# Patient Record
Sex: FEMALE | Race: WHITE | NOT HISPANIC OR LATINO | Employment: FULL TIME | URBAN - METROPOLITAN AREA
[De-identification: names, ages, dates, MRNs, and addresses within clinical notes are randomized per-mention and may not be internally consistent; named-entity substitution may affect disease eponyms.]

---

## 2017-08-21 ENCOUNTER — TELEPHONE (OUTPATIENT)
Dept: VASCULAR SURGERY | Facility: CLINIC | Age: 52
End: 2017-08-21

## 2017-08-21 NOTE — TELEPHONE ENCOUNTER
----- Message from Citlalli Ruiz sent at 8/21/2017 12:19 PM CDT -----  Contact: patient  Patient returning a missed call. Please advise. Call to pod. Call connected to pod. Warm transferred.  Thanks!

## 2017-08-22 ENCOUNTER — OFFICE VISIT (OUTPATIENT)
Dept: VASCULAR SURGERY | Facility: CLINIC | Age: 52
End: 2017-08-22
Payer: COMMERCIAL

## 2017-08-22 VITALS
HEART RATE: 80 BPM | HEIGHT: 66 IN | WEIGHT: 200.81 LBS | SYSTOLIC BLOOD PRESSURE: 124 MMHG | DIASTOLIC BLOOD PRESSURE: 71 MMHG | BODY MASS INDEX: 32.27 KG/M2

## 2017-08-22 DIAGNOSIS — R06.09 DOE (DYSPNEA ON EXERTION): ICD-10-CM

## 2017-08-22 DIAGNOSIS — I34.0 NON-RHEUMATIC MITRAL REGURGITATION: ICD-10-CM

## 2017-08-22 DIAGNOSIS — I10 ESSENTIAL HYPERTENSION: ICD-10-CM

## 2017-08-22 DIAGNOSIS — D50.0 IRON DEFICIENCY ANEMIA DUE TO CHRONIC BLOOD LOSS: ICD-10-CM

## 2017-08-22 PROCEDURE — 99999 PR PBB SHADOW E&M-EST. PATIENT-LVL III: CPT | Mod: PBBFAC,,, | Performed by: THORACIC SURGERY (CARDIOTHORACIC VASCULAR SURGERY)

## 2017-08-22 PROCEDURE — 99245 OFF/OP CONSLTJ NEW/EST HI 55: CPT | Mod: S$GLB,,, | Performed by: THORACIC SURGERY (CARDIOTHORACIC VASCULAR SURGERY)

## 2017-08-22 RX ORDER — ASPIRIN 81 MG/1
81 TABLET ORAL
Status: ON HOLD | COMMUNITY
End: 2017-09-28 | Stop reason: HOSPADM

## 2017-08-22 RX ORDER — LISINOPRIL 20 MG/1
TABLET ORAL
COMMUNITY
Start: 2017-07-18 | End: 2017-08-22

## 2017-08-22 RX ORDER — FLUOXETINE 10 MG/1
TABLET ORAL
COMMUNITY
Start: 2017-08-13 | End: 2020-02-21

## 2017-08-22 RX ORDER — CARVEDILOL 25 MG/1
TABLET ORAL
Status: ON HOLD | COMMUNITY
Start: 2017-08-11 | End: 2017-09-28 | Stop reason: HOSPADM

## 2017-08-22 RX ORDER — METOPROLOL TARTRATE 25 MG/1
TABLET, FILM COATED ORAL
COMMUNITY
Start: 2017-07-15 | End: 2017-08-22

## 2017-08-22 RX ORDER — ALPRAZOLAM 0.5 MG/1
TABLET ORAL
COMMUNITY
Start: 2017-08-11 | End: 2020-02-27 | Stop reason: SDUPTHER

## 2017-08-22 NOTE — LETTER
August 22, 2017      Steven Morales MD  [address] [facility] [address]           [facility]  [address] [facility] [address]  Phone: [phone]          Patient: Karolina Jacobs   MR Number: [id number]   YOB: 1965   Date of Visit: 8/22/2017       Dear Dr. Steven Morales:    Thank you for referring Karolina Jacobs to me for evaluation. Attached you will find relevant portions of my assessment and plan of care.    If you have questions, please do not hesitate to call me. I look forward to following Karolina Jacobs along with you.    Sincerely,    Herrera Vasquez MD    Enclosure  CC:  No Recipients    If you would like to receive this communication electronically, please contact [email or web address] or [phone] to request more information on [facility] access.    For providers and/or their staff who would like to refer a patient to [facility], please contact us through our one-stop-shop provider referral line, Naseem Guzman, at [phone].    If you feel you have received this communication in error or would no longer like to receive these types of communications, please e-mail [email or web address]

## 2017-08-22 NOTE — PROGRESS NOTES
CLINIC CONSULTATION NOTE    CHIEF COMPLAINT:  Shortness of breath with exertion.    REASON FOR CONSULTATION:  Evaluation of mitral valve disease.    HISTORY OF PRESENT ILLNESS:  The patient is a 52-year-old white female who was   diagnosed in  with mitral valve insufficiency as noted on echocardiogram.    She was told for several years prior to that she had mitral valve prolapse, but   no echocardiography was done.  She has developed significant progressive   exertional dyspnea with fewer and lighter activities.  She has undergone left   and right heart catheterization as well as transesophageal echocardiography.    She has no chest pain.    PAST MEDICAL HISTORY:  Hypertension, mitral valve prolapse, anemia of blood loss   (heavy menstrual period).    PAST SURGICAL HISTORY:  Tubal ligation.    MEDICATIONS:  Include aspirin 81 mg daily, Coreg, fluoxetine, lisinopril,   metoprolol and Xanax p.r.n.    ALLERGIES:  Sulfa.    SOCIAL HISTORY:  Negative for tobacco use, lifelong.    FAMILY HISTORY:  Positive for premature atherosclerotic coronary artery disease   in her father who  at age 45, cancer in maternal grandfather, heart disease   in mother, diabetes in maternal grandfather.    REVIEW OF SYSTEMS:  GENERAL:  The patient has had some slight weight increase over the last two   years.  She has mild-to-moderate fatigue, especially with activities.  HEENT:  Occasional headaches since starting Coreg.  She wears eyeglasses.  NECK:  No complaints.  RESPIRATORY:  Positive shortness of breath with exertion.  CARDIAC:  No angina, orthopnea or PND.  She does have shortness of breath with   exertion and a pinching like left anterior chest pain.  GASTROINTESTINAL:  Positive constipation, especially when taking iron (she   therefore does not take iron).  GENITOURINARY:  No dysuria.  GYNECOLOGIC:  Positive for heavy menstrual bleeding.  Last menstrual period is   now.  MUSCULOSKELETAL:  She has no arthralgias.  NEUROLOGIC:   No lateralizing complaints.    PHYSICAL EXAMINATION:  VITAL SIGNS:  Blood pressure 124/71, heart rate 80, weight 91.1 kg.  GENERAL:  Pleasant, well-nourished, well-developed lady, in no obvious distress.  HEENT:  Normocephalic, atraumatic.  There is good facial symmetry.  NECK:  Trachea is midline.  There is no jugular venous distention.  CHEST:  No chest wall abnormalities.  LUNGS:  Clear bilaterally.  HEART:  Regular rate and rhythm with a II/VI systolic murmur that radiates into   the left axilla.  ABDOMEN:  Soft.  No organomegaly.  EXTREMITIES:  No cyanosis, clubbing or edema.  VASCULAR:  2+ radial and dorsalis pedis pulses.  SKIN:  No rash.  NEUROLOGIC:  Alert and oriented x4 with no focal deficits.    STUDIES:  I reviewed a transthoracic echocardiogram personally revealing   moderate mitral insufficiency.  I have reviewed left heart catheterization from   08/16/2017 revealing normal coronary arteries.  Left and right heart   catheterization results include aortic pressure 113/80, LVEDP 19, left   ventricular ejection fraction 50-55% with 2+ to 3+ mitral insufficiency, RA 5,   RV 20/6, PA 20/10, wedge 13 with a large V-wave up to 19.  Cardiac output 5.7   liters per minute (2.8 cardiac index).  Transesophageal echocardiogram reveals   by report severe mitral insufficiency through what appears to be prolapse of the   P2-P3 segments of the posterior leaflet.  Left atrium is 4 cm.  Left   ventricular ejection fraction 61.6%.    IMPRESSION:  1.  Moderate-to-severe mitral valve insufficiency related to prolapse of the   posterior leaflet.  2.  Exertional dyspnea secondary to above.  3.  Anemia (hemoglobin of 10), secondary to chronic blood loss.  4.  Essential hypertension.    RECOMMENDATIONS:  Before I recommend surgery for the patient's mitral valve, I   would like to review the transesophageal echocardiogram personally with Dr. Morales.  Images for this are not available for my review currently.  If it   is  simply the central leaflet that is prolapsed posteriorly, then there is a   possibility of mitral valve repair, but if both P2 and P3 are prolapsing, she   would require replacement, which at her age, I would recommend a mechanical   valve.  The difficulty with this is that it would require warfarin, which would   likely increase problems with her menstrual bleeding and chronic anemia.  After   discussion with Dr. Morales, I will have the patient return for further   discussion.      DIONICIO/LUZ MARIA  dd: 08/22/2017 14:21:13 (CDT)  td: 08/22/2017 16:41:17 (CDT)  Doc ID   #[id number]  Job ID #[id number]    CC: Steven Morales M.D.

## 2017-08-28 ENCOUNTER — TELEPHONE (OUTPATIENT)
Dept: VASCULAR SURGERY | Facility: CLINIC | Age: 52
End: 2017-08-28

## 2017-08-28 NOTE — TELEPHONE ENCOUNTER
----- Message from Ama Fisher sent at 8/28/2017 10:38 AM CDT -----  Contact: patient  Calling concerning a office appointment and surgery date. Please call patient ASAP today @ [phone]. Thanks, lewis

## 2017-08-29 ENCOUNTER — TELEPHONE (OUTPATIENT)
Dept: VASCULAR SURGERY | Facility: CLINIC | Age: 52
End: 2017-08-29

## 2017-08-29 NOTE — TELEPHONE ENCOUNTER
Discussed BARBRA findings with patient (after my review with Dr Morales).  MR likely from combined P2 and P3 prolapse. This would be much more difficult to repair, and it would more likely require mitral replacement in my hands.    Regardless of this, I believe that she should have her heavy menstrual bleeding corrected before mitral surgery with potential need for post op coumadin.    Will refer to Dr Russell for his opinion on chances of repair of the valve.

## 2017-09-05 ENCOUNTER — OFFICE VISIT (OUTPATIENT)
Dept: CARDIOTHORACIC SURGERY | Facility: CLINIC | Age: 52
End: 2017-09-05
Payer: COMMERCIAL

## 2017-09-05 VITALS
WEIGHT: 200.81 LBS | OXYGEN SATURATION: 100 % | BODY MASS INDEX: 32.27 KG/M2 | HEART RATE: 72 BPM | TEMPERATURE: 98 F | SYSTOLIC BLOOD PRESSURE: 120 MMHG | HEIGHT: 66 IN | DIASTOLIC BLOOD PRESSURE: 58 MMHG

## 2017-09-05 DIAGNOSIS — I34.0 NON-RHEUMATIC MITRAL REGURGITATION: Primary | ICD-10-CM

## 2017-09-05 PROCEDURE — 99999 PR PBB SHADOW E&M-EST. PATIENT-LVL III: CPT | Mod: PBBFAC,,, | Performed by: THORACIC SURGERY (CARDIOTHORACIC VASCULAR SURGERY)

## 2017-09-05 PROCEDURE — 99214 OFFICE O/P EST MOD 30 MIN: CPT | Mod: S$GLB,,, | Performed by: THORACIC SURGERY (CARDIOTHORACIC VASCULAR SURGERY)

## 2017-09-05 PROCEDURE — 3008F BODY MASS INDEX DOCD: CPT | Mod: S$GLB,,, | Performed by: THORACIC SURGERY (CARDIOTHORACIC VASCULAR SURGERY)

## 2017-09-05 NOTE — PROGRESS NOTES
Subjective:      Patient ID: Karolina Jacobs is a 52 y.o. female.    Chief Complaint: Consult    HPI:  Karolina Jacobs is a 52 y.o. female who present with complaints of dyspnea on exertion. PMHx of mitral valve insufficiency in 2013, anemia, HTN. She was told for several years prior to that she had mitral valve prolapse, but   no echocardiography was done.  She has developed significant progressive exertional dyspnea with fewer and lighter activities. She has undergone left and right heart catheterization as well as transesophageal echocardiography.      Review of patient's allergies indicates:   Allergen Reactions    Sulfa (sulfonamide antibiotics) Other (See Comments)     Unknown, pt was a child when she had reaction     Past Medical History:   Diagnosis Date    MAGDALENO (dyspnea on exertion) 8/22/2017    Essential hypertension 8/22/2017    Iron deficiency anemia due to chronic blood loss 8/22/2017    Non-rheumatic mitral regurgitation 8/22/2017     No past surgical history on file.  Family History     None        Social History     Social History    Marital status:      Spouse name: N/A    Number of children: N/A    Years of education: N/A     Occupational History    Not on file.     Social History Main Topics    Smoking status: Never Smoker    Smokeless tobacco: Never Used    Alcohol use No    Drug use: Unknown    Sexual activity: Not on file     Other Topics Concern    Not on file     Social History Narrative    No narrative on file       Current medications Reviewed    Review of Systems   Constitutional: Positive for activity change and fatigue. Negative for appetite change and fever.   HENT: Negative for congestion, dental problem, sneezing and sore throat.    Eyes: Negative for pain, discharge and visual disturbance.   Respiratory: Positive for shortness of breath. Negative for cough and wheezing.    Cardiovascular: Positive for palpitations. Negative for chest pain and leg swelling.    Gastrointestinal: Negative for abdominal distention, abdominal pain, constipation, diarrhea, nausea and vomiting.   Endocrine: Negative for polydipsia, polyphagia and polyuria.   Genitourinary: Negative for dysuria, frequency and urgency.   Musculoskeletal: Negative for back pain and gait problem.   Skin: Negative for rash and wound.   Neurological: Negative for dizziness, seizures, syncope and headaches.   Hematological: Does not bruise/bleed easily.   Psychiatric/Behavioral: Negative for agitation and confusion. The patient is not nervous/anxious.      Objective:   Physical Exam   Constitutional: She is oriented to person, place, and time. She appears well-developed and well-nourished.   HENT:   Head: Normocephalic and atraumatic.   Eyes: Pupils are equal, round, and reactive to light.   Neck: Normal range of motion. Neck supple.   Cardiovascular: Normal rate and regular rhythm.    Murmur heard.  Pulmonary/Chest: Effort normal and breath sounds normal.   Abdominal: Soft. Bowel sounds are normal.   Musculoskeletal: Normal range of motion.   Neurological: She is alert and oriented to person, place, and time.   Skin: Skin is warm and dry.   Psychiatric: She has a normal mood and affect. Her behavior is normal.     Assessment:   1. Mitral Valve prolapse   2. Mitral valve regurg   3. Iron deficiency anemia  4. Heavy menstrual bleeding   5. HTN  Plan:       CTS Attending Note:    I have personally taken the history and examined this patient and agree with the NP's note as stated above. 53 yo F with severe MR and lifestyle limiting MAGDALENO. NL cors on C. I recommended MVr.  We discussed the risks and benefits of the proposed procedure, including but not limited to death, stroke, respiratory complications, renal complications, arrythmia, need for permanent pacemaker, and infection. Her questions have been answered, and she wishes to proceed. The patient is considering valve choice after a discussion of the advantages and  disadvantages of mechanical and tissue prostheses should repair not be feasible.

## 2017-09-05 NOTE — LETTER
[facility]  [address]  [place] [address]  Phone: [phone] September 7, 2017        Steven Morales MD  [address] [address]    Patient: Karolina Jacobs   MR Number: [id number]   YOB: 1965   Date of Visit: 9/5/2017     Dear Dr. Morales:    I had the pleasure of seeing your patient Mrs. Karolina Jacobs in clinic today.  As you know, she is a pleasant 52-year-old woman that has been followed for some time for mitral prolapse.  She recently developed lifestyle limiting dyspnea on exertion, and after a thoughtful and thorough evaluation her mitral prolapse was found to have progressed to severe mitral regurgitation.  The mechanism of her mitral regurgitation seemed to involve multiple portions of the posterior leaflet, and as a result Dr. Murry asked me to see her for evaluation for mitral repair.    I saw Mrs. Jacobs, and discussed her situation with her and her daughter in detail.  I recommended mitral valve repair, and she agreed with this.  We will plan to get this done for her sometime in the near future.  When she does come to surgery, I will certainly keep you appraised of her progress.     Thank you for referring Karolina Jacobs to me for evaluation. If you have any questions, please do not hesitate to call me.     Sincerely,        Philip Russell MD   Chief, Division of Thoracic & Cardiovascular Surgery  [facility]    PEP/ecs      CC  MD Herrera Lopez MD

## 2017-09-21 ENCOUNTER — OFFICE VISIT (OUTPATIENT)
Dept: CARDIOTHORACIC SURGERY | Facility: CLINIC | Age: 52
End: 2017-09-21
Payer: COMMERCIAL

## 2017-09-21 ENCOUNTER — HOSPITAL ENCOUNTER (OUTPATIENT)
Dept: VASCULAR SURGERY | Facility: CLINIC | Age: 52
Discharge: HOME | End: 2017-09-21
Attending: THORACIC SURGERY (CARDIOTHORACIC VASCULAR SURGERY)
Payer: COMMERCIAL

## 2017-09-21 ENCOUNTER — HOSPITAL ENCOUNTER (OUTPATIENT)
Dept: PULMONOLOGY | Facility: CLINIC | Age: 52
Discharge: HOME | End: 2017-09-21
Payer: COMMERCIAL

## 2017-09-21 ENCOUNTER — HOSPITAL ENCOUNTER (OUTPATIENT)
Dept: CARDIOLOGY | Facility: CLINIC | Age: 52
Discharge: HOME | End: 2017-09-21
Payer: COMMERCIAL

## 2017-09-21 ENCOUNTER — HOSPITAL ENCOUNTER (OUTPATIENT)
Dept: PREADMISSION TESTING | Facility: HOSPITAL | Age: 52
Discharge: HOME | End: 2017-09-21
Attending: ANESTHESIOLOGY
Payer: COMMERCIAL

## 2017-09-21 ENCOUNTER — ANESTHESIA EVENT (OUTPATIENT)
Dept: SURGERY | Facility: HOSPITAL | Age: 52
DRG: 221 | End: 2017-09-21
Payer: COMMERCIAL

## 2017-09-21 ENCOUNTER — HOSPITAL ENCOUNTER (OUTPATIENT)
Dept: RADIOLOGY | Facility: HOSPITAL | Age: 52
Discharge: HOME | End: 2017-09-21
Attending: THORACIC SURGERY (CARDIOTHORACIC VASCULAR SURGERY)
Payer: COMMERCIAL

## 2017-09-21 VITALS
RESPIRATION RATE: 18 BRPM | OXYGEN SATURATION: 99 % | BODY MASS INDEX: 32.51 KG/M2 | HEIGHT: 66 IN | WEIGHT: 202.31 LBS | SYSTOLIC BLOOD PRESSURE: 114 MMHG | HEART RATE: 72 BPM | TEMPERATURE: 99 F | DIASTOLIC BLOOD PRESSURE: 55 MMHG

## 2017-09-21 VITALS
DIASTOLIC BLOOD PRESSURE: 63 MMHG | BODY MASS INDEX: 32.62 KG/M2 | WEIGHT: 203 LBS | HEIGHT: 66 IN | HEART RATE: 75 BPM | SYSTOLIC BLOOD PRESSURE: 126 MMHG | TEMPERATURE: 99 F | OXYGEN SATURATION: 100 %

## 2017-09-21 DIAGNOSIS — I34.0 NON-RHEUMATIC MITRAL REGURGITATION: ICD-10-CM

## 2017-09-21 DIAGNOSIS — I34.0 NON-RHEUMATIC MITRAL REGURGITATION: Primary | ICD-10-CM

## 2017-09-21 DIAGNOSIS — I65.23 CAROTID STENOSIS, BILATERAL: ICD-10-CM

## 2017-09-21 DIAGNOSIS — Z01.810 PREOP CARDIOVASCULAR EXAM: ICD-10-CM

## 2017-09-21 DIAGNOSIS — I34.0 MITRAL REGURGITATION: ICD-10-CM

## 2017-09-21 LAB
PRE FEV1 FVC: 76
PRE FEV1: 2.46
PRE FVC: 3.25
PREDICTED FEV1 FVC: 80
PREDICTED FEV1: 2.85
PREDICTED FVC: 3.52

## 2017-09-21 PROCEDURE — 71020 XR CHEST PA AND LATERAL: CPT | Mod: TC

## 2017-09-21 PROCEDURE — 93000 ELECTROCARDIOGRAM COMPLETE: CPT | Mod: S$GLB,,, | Performed by: INTERNAL MEDICINE

## 2017-09-21 PROCEDURE — 36600 WITHDRAWAL OF ARTERIAL BLOOD: CPT | Mod: 51,S$GLB,, | Performed by: INTERNAL MEDICINE

## 2017-09-21 PROCEDURE — 93880 EXTRACRANIAL BILAT STUDY: CPT | Mod: S$GLB,,, | Performed by: SURGERY

## 2017-09-21 PROCEDURE — 71020 XR CHEST PA AND LATERAL: CPT | Mod: 26,,, | Performed by: RADIOLOGY

## 2017-09-21 PROCEDURE — 99999 PR PBB SHADOW E&M-EST. PATIENT-LVL IV: CPT | Mod: PBBFAC,,, | Performed by: THORACIC SURGERY (CARDIOTHORACIC VASCULAR SURGERY)

## 2017-09-21 PROCEDURE — 94010 BREATHING CAPACITY TEST: CPT | Mod: S$GLB,,, | Performed by: INTERNAL MEDICINE

## 2017-09-21 PROCEDURE — 99499 UNLISTED E&M SERVICE: CPT | Mod: S$GLB,,, | Performed by: THORACIC SURGERY (CARDIOTHORACIC VASCULAR SURGERY)

## 2017-09-21 PROCEDURE — 82803 BLOOD GASES ANY COMBINATION: CPT | Mod: S$GLB,,, | Performed by: INTERNAL MEDICINE

## 2017-09-21 RX ORDER — POTASSIUM CHLORIDE 14.9 MG/ML
60 INJECTION INTRAVENOUS
Status: CANCELLED | OUTPATIENT
Start: 2017-09-21

## 2017-09-21 RX ORDER — MUPIROCIN 20 MG/G
1 OINTMENT TOPICAL
Status: CANCELLED | OUTPATIENT
Start: 2017-09-21

## 2017-09-21 RX ORDER — MUPIROCIN 20 MG/G
1 OINTMENT TOPICAL 2 TIMES DAILY
Status: CANCELLED | OUTPATIENT
Start: 2017-09-21 | End: 2017-09-26

## 2017-09-21 RX ORDER — ASPIRIN 325 MG
325 TABLET, DELAYED RELEASE (ENTERIC COATED) ORAL DAILY
Status: CANCELLED | OUTPATIENT
Start: 2017-09-22

## 2017-09-21 RX ORDER — SODIUM CHLORIDE 0.9 % (FLUSH) 0.9 %
3 SYRINGE (ML) INJECTION EVERY 8 HOURS
Status: CANCELLED | OUTPATIENT
Start: 2017-09-21

## 2017-09-21 RX ORDER — POTASSIUM CHLORIDE 14.9 MG/ML
20 INJECTION INTRAVENOUS
Status: CANCELLED | OUTPATIENT
Start: 2017-09-21

## 2017-09-21 RX ORDER — FENTANYL CITRATE 50 UG/ML
25 INJECTION, SOLUTION INTRAMUSCULAR; INTRAVENOUS
Status: CANCELLED | OUTPATIENT
Start: 2017-09-21

## 2017-09-21 RX ORDER — PROPOFOL 10 MG/ML
5 INJECTION, EMULSION INTRAVENOUS CONTINUOUS
Status: CANCELLED | OUTPATIENT
Start: 2017-09-21

## 2017-09-21 RX ORDER — ATORVASTATIN CALCIUM 10 MG/1
40 TABLET, FILM COATED ORAL NIGHTLY
Status: CANCELLED | OUTPATIENT
Start: 2017-09-21

## 2017-09-21 RX ORDER — FENTANYL CITRATE 50 UG/ML
25 INJECTION, SOLUTION INTRAMUSCULAR; INTRAVENOUS
Status: CANCELLED | OUTPATIENT
Start: 2017-09-21 | End: 2017-09-21

## 2017-09-21 RX ORDER — POTASSIUM CHLORIDE 29.8 MG/ML
40 INJECTION INTRAVENOUS
Status: CANCELLED | OUTPATIENT
Start: 2017-09-21

## 2017-09-21 RX ORDER — METOPROLOL TARTRATE 25 MG/1
12.5 TABLET ORAL EVERY 12 HOURS
Status: CANCELLED | OUTPATIENT
Start: 2017-09-21

## 2017-09-21 RX ORDER — ASPIRIN 325 MG
325 TABLET ORAL DAILY
Status: CANCELLED | OUTPATIENT
Start: 2017-09-21

## 2017-09-21 RX ORDER — POTASSIUM CHLORIDE 750 MG/1
20 TABLET, EXTENDED RELEASE ORAL EVERY 12 HOURS
Status: CANCELLED | OUTPATIENT
Start: 2017-09-21

## 2017-09-21 RX ORDER — ACETAMINOPHEN 325 MG/1
650 TABLET ORAL EVERY 4 HOURS PRN
Status: CANCELLED | OUTPATIENT
Start: 2017-09-21

## 2017-09-21 RX ORDER — IPRATROPIUM BROMIDE AND ALBUTEROL SULFATE 2.5; .5 MG/3ML; MG/3ML
3 SOLUTION RESPIRATORY (INHALATION) EVERY 4 HOURS PRN
Status: CANCELLED | OUTPATIENT
Start: 2017-09-21

## 2017-09-21 RX ORDER — METOCLOPRAMIDE HYDROCHLORIDE 5 MG/ML
5 INJECTION INTRAMUSCULAR; INTRAVENOUS EVERY 6 HOURS PRN
Status: CANCELLED | OUTPATIENT
Start: 2017-09-21

## 2017-09-21 RX ORDER — PANTOPRAZOLE SODIUM 40 MG/1
40 TABLET, DELAYED RELEASE ORAL
Status: CANCELLED | OUTPATIENT
Start: 2017-09-22

## 2017-09-21 RX ORDER — IPRATROPIUM BROMIDE AND ALBUTEROL SULFATE 2.5; .5 MG/3ML; MG/3ML
3 SOLUTION RESPIRATORY (INHALATION) EVERY 4 HOURS
Status: CANCELLED | OUTPATIENT
Start: 2017-09-21 | End: 2017-09-22

## 2017-09-21 RX ORDER — OXYCODONE HYDROCHLORIDE 5 MG/1
5 TABLET ORAL EVERY 4 HOURS PRN
Status: CANCELLED | OUTPATIENT
Start: 2017-09-21

## 2017-09-21 RX ORDER — LIDOCAINE HYDROCHLORIDE 10 MG/ML
1 INJECTION, SOLUTION EPIDURAL; INFILTRATION; INTRACAUDAL; PERINEURAL
Status: CANCELLED | OUTPATIENT
Start: 2017-09-21

## 2017-09-21 RX ORDER — ASPIRIN 300 MG/1
300 SUPPOSITORY RECTAL ONCE AS NEEDED
Status: CANCELLED | OUTPATIENT
Start: 2017-09-21 | End: 2017-09-21

## 2017-09-21 RX ORDER — SODIUM CHLORIDE 9 MG/ML
INJECTION, SOLUTION INTRAVENOUS CONTINUOUS
Status: CANCELLED | OUTPATIENT
Start: 2017-09-21

## 2017-09-21 RX ORDER — FUROSEMIDE 10 MG/ML
20 INJECTION INTRAMUSCULAR; INTRAVENOUS 2 TIMES DAILY
Status: CANCELLED | OUTPATIENT
Start: 2017-09-21

## 2017-09-21 RX ORDER — ONDANSETRON 2 MG/ML
4 INJECTION INTRAMUSCULAR; INTRAVENOUS EVERY 12 HOURS PRN
Status: CANCELLED | OUTPATIENT
Start: 2017-09-21

## 2017-09-21 RX ORDER — ASPIRIN 325 MG
325 TABLET ORAL ONCE
Status: CANCELLED | OUTPATIENT
Start: 2017-09-21 | End: 2017-09-21

## 2017-09-21 RX ORDER — POLYETHYLENE GLYCOL 3350 17 G/17G
17 POWDER, FOR SOLUTION ORAL DAILY
Status: CANCELLED | OUTPATIENT
Start: 2017-09-22

## 2017-09-21 RX ORDER — METOPROLOL TARTRATE 25 MG/1
25 TABLET ORAL
Status: CANCELLED | OUTPATIENT
Start: 2017-09-21

## 2017-09-21 RX ORDER — DEXTROSE MONOHYDRATE, SODIUM CHLORIDE, AND POTASSIUM CHLORIDE 50; 1.49; 4.5 G/1000ML; G/1000ML; G/1000ML
INJECTION, SOLUTION INTRAVENOUS CONTINUOUS
Status: CANCELLED | OUTPATIENT
Start: 2017-09-21

## 2017-09-21 RX ORDER — ALBUMIN HUMAN 50 G/1000ML
500 SOLUTION INTRAVENOUS ONCE AS NEEDED
Status: CANCELLED | OUTPATIENT
Start: 2017-09-21 | End: 2017-09-21

## 2017-09-21 RX ORDER — DOCUSATE SODIUM 100 MG/1
100 CAPSULE, LIQUID FILLED ORAL 2 TIMES DAILY
Status: CANCELLED | OUTPATIENT
Start: 2017-09-21

## 2017-09-21 RX ORDER — BISACODYL 10 MG
10 SUPPOSITORY, RECTAL RECTAL EVERY 12 HOURS PRN
Status: CANCELLED | OUTPATIENT
Start: 2017-09-21

## 2017-09-21 RX ORDER — FENTANYL CITRATE 50 UG/ML
50 INJECTION, SOLUTION INTRAMUSCULAR; INTRAVENOUS
Status: CANCELLED | OUTPATIENT
Start: 2017-09-21

## 2017-09-21 RX ORDER — OXYCODONE HYDROCHLORIDE 5 MG/1
10 TABLET ORAL EVERY 4 HOURS PRN
Status: CANCELLED | OUTPATIENT
Start: 2017-09-21

## 2017-09-21 RX ORDER — PANTOPRAZOLE SODIUM 40 MG/10ML
40 INJECTION, POWDER, LYOPHILIZED, FOR SOLUTION INTRAVENOUS DAILY
Status: CANCELLED | OUTPATIENT
Start: 2017-09-22

## 2017-09-21 NOTE — DISCHARGE INSTRUCTIONS
Your surgery has been scheduled for:__________________________________________    You should report to:  ____[facility], located on the [place] side of the first floor of the           [facility] ([phone])  ____[facility], located on the [facility] side of the            Second floor of the [facility] ([phone])  ____3rd Floor [facility] located on the [facility] side of the [facility] [phone]  Please Note   - Tell your doctor if you take Aspirin, products containing Aspirin, herbal medications  or blood thinners, such as Coumadin, Ticlid, or Plavix.  (Consult your provider regarding holding or stopping before surgery).  - Arrange for someone to drive you home following surgery.  You will not be allowed to leave the surgical facility alone or drive yourself home following sedation and anesthesia.  Before Surgery  - Stop taking all herbal medications 14days prior to surgery  - No Motrin/Advil (Ibuprofen) 7 days before surgery  - No Aleve (Naproxen) 7 days before surgery  - Stop Taking Asprin, products containing Asprin _____days before surgery  - Stop taking blood thinners_______days before surgery  - Refrain from drinking alcoholic beverages for 24hours before and after surgery  - Stop or limit smoking _________days before surgery  Night before Surgery  - DO NOT EAT OR DRINK ANYTHING AFTER MIDNIGHT, INCLUDING GUM, HARD CANDY, MINTS, OR CHEWING TOBACCO.  - Take a shower or bath (shower is recommended).  Bathe with Hibiclens soap or an antibacterial soap from the neck down.  If not supplied by your surgeon, hibiclens soap will need to be purchased over the counter in pharmacy.  Rinse soap off thoroughly.  - Shampoo your hair with your regular shampoo  The Day of Surgery  - Take another bath or shower with hibiclens or any antibacterial soap, to reduce the chance of infection.  - Take heart and blood  pressure medications with a small sip of water, as advised by the perioperative team.  - Do not take fluid pills  - You may brush your teeth and rinse your mouth, but do not swall any additional water.   - Do not apply perfumes, powder, body lotions or deodorant on the day of surgery.  - Nail polish should be removed.  - Do not wear makeup or moisturizer  - Wear comfortable clothes, such as a button front shirt and loose fitting pants.  - Leave all jewelry, including body piercings, and valuables at home.    - Bring any devices you will neeed after surgery such as crutches or canes.  - If you have sleep apnea, please bring your CPAP machine  In the event that your physical condition changes including the onset of a cold or respiratory illness, or if you have to delay or cancel your surgery, please notify your surgeon.  Anesthesia: General Anesthesia  Youre due to have surgery. During surgery, youll be given medication called anesthesia. (It is also called anesthetic.) This will keep you comfortable and pain-free. Your anesthesia provider will use general anesthesia. This sheet tells you more about it.  What is general anesthesia?     You are watched continuously during your procedure by the anesthesia provider   General anesthesia puts you into a state like deep sleep. It goes into the bloodstream (IV anesthetics), into the lungs (gas anesthetics), or both. You feel nothing during the procedure. You will not remember it. During the procedure, the anesthesia provider monitors you continuously. He or she checks your heart rate and rhythm, blood pressure, breathing, and blood oxygen.  · IV Anesthetics. IV anesthetics are given through an IV line in your arm. Theyre often given first. This is so you are asleep before a gas anesthetic is started. Some kinds of IV anesthetics relieve pain. Others relax you. Your doctor will decide which kind is best in your case.  · Gas Anesthetics. Gas anesthetics are breathed into  the lungs. They are often used to keep you asleep. They can be given through a facemask or a tube placed in your larynx or trachea (breathing tube).  ? If you have a facemask, your anesthesia provider will most likely place it over your nose and mouth while youre still awake. Youll breathe oxygen through the mask as your IV anesthetic is started. Gas anesthetic may be added through the mask.  ? If you have a tube in the larynx or trachea, it will be inserted into your throat after youre asleep.  Anesthesia tools and medications  You will likely have:  · IV anesthetics. These are put into an IV line into your bloodstream.  · Gas anesthetics. You breathe these anesthetics into your lungs, where they pass into your bloodstream.  · Pulse oximeter. This is a small clip that is attached to the end of your finger. This measures your blood oxygen level.  · Electrocardiography leads (electrodes). These are small sticky pads that are placed on your chest. They record your heart rate and rhythm.  · Blood pressure cuff. This reads your blood pressure.  Risks and possible complications  General anesthesia has some risks. These include:  · Breathing problems  · Nausea and vomiting  · Sore throat or hoarseness (usually temporary)  · Allergic reaction to the anesthetic  · Irregular heartbeat (rare)  · Cardiac arrest (rare)   Anesthesia safety  · Follow all instructions you are given for how long not to eat or drink before your procedure.  · Be sure your doctor knows what medications and drugs you take. This includes over-the-counter medications, herbs, supplements, alcohol or other drugs. You will be asked when those were last taken.  · Have an adult family member or friend drive you home after the procedure.  · For the first 24 hours after your surgery:  ? Do not drive or use heavy equipment.  ? Have a trusted family member or spouse make important decisions or sign documents.  ? Avoid alcohol.  ? Have a responsible adult stay  with you. He or she can watch for problems and help keep you safe.  Date Last Reviewed: 10/16/2014  © 8345-6266 [employer]. [address] [place] [address], [place] [address]. All rights reserved. This information is not intended as a substitute for professional medical care. Always follow your healthcare professional's instructions.

## 2017-09-21 NOTE — PROGRESS NOTES
Subjective:      Patient ID: Karolina Jacobs is a 52 y.o. female.    Chief Complaint: Pre-op Exam    HPI:  Karolina Jacobs is a 52 y.o. female who present with complaints of dyspnea on exertion. PMHx of mitral valve insufficiency in 2013, anemia, HTN. She was told for several years prior to that she had mitral valve prolapse, but   no echocardiography was done.  She has developed significant progressive exertional dyspnea with fewer and lighter activities. She has undergone left and right heart catheterization as well as transesophageal echocardiography.      Review of patient's allergies indicates:   Allergen Reactions    Sulfa (sulfonamide antibiotics) Other (See Comments)     Unknown, pt was a child when she had reaction     Past Medical History:   Diagnosis Date    MAGDALENO (dyspnea on exertion) 8/22/2017    Essential hypertension 8/22/2017    Iron deficiency anemia due to chronic blood loss 8/22/2017    Non-rheumatic mitral regurgitation 8/22/2017     No past surgical history on file.  Family History     None        Social History     Social History    Marital status:      Spouse name: N/A    Number of children: N/A    Years of education: N/A     Occupational History    Not on file.     Social History Main Topics    Smoking status: Never Smoker    Smokeless tobacco: Never Used    Alcohol use No    Drug use: Unknown    Sexual activity: Not on file     Other Topics Concern    Not on file     Social History Narrative    No narrative on file       Current medications Reviewed    Review of Systems   Constitutional: Positive for activity change and fatigue. Negative for appetite change and fever.   HENT: Negative for congestion, dental problem, sneezing and sore throat.    Eyes: Negative for pain, discharge and visual disturbance.   Respiratory: Positive for shortness of breath. Negative for cough and wheezing.    Cardiovascular: Positive for palpitations. Negative for chest pain and leg swelling.    Gastrointestinal: Negative for abdominal distention, abdominal pain, constipation, diarrhea, nausea and vomiting.   Endocrine: Negative for polydipsia, polyphagia and polyuria.   Genitourinary: Negative for dysuria, frequency and urgency.   Musculoskeletal: Negative for back pain and gait problem.   Skin: Negative for rash and wound.   Neurological: Negative for dizziness, seizures, syncope and headaches.   Hematological: Does not bruise/bleed easily.   Psychiatric/Behavioral: Negative for agitation and confusion. The patient is not nervous/anxious.      Objective:   Physical Exam   Constitutional: She is oriented to person, place, and time. She appears well-developed and well-nourished.   HENT:   Head: Normocephalic and atraumatic.   Eyes: Pupils are equal, round, and reactive to light.   Neck: Normal range of motion. Neck supple.   Cardiovascular: Normal rate and regular rhythm.    Murmur heard.  Pulmonary/Chest: Effort normal and breath sounds normal.   Abdominal: Soft. Bowel sounds are normal.   Musculoskeletal: Normal range of motion.   Neurological: She is alert and oriented to person, place, and time.   Skin: Skin is warm and dry.   Psychiatric: She has a normal mood and affect. Her behavior is normal.     Assessment:   1. Mitral Valve prolapse   2. Mitral valve regurg   3. Iron deficiency anemia  4. Heavy menstrual bleeding   5. HTN  Plan:       - To OR for MVR on 9/25/17  - Consents signed and in chart    Rosales Ruiz M.D.  General Surgery PGY2  [phone]        CTS Attending Note:    I have personally taken the history and examined this patient and agree with the resident's note as stated above. Questions answered. She desires a mechanical valve if repair is not feasible.

## 2017-09-21 NOTE — ANESTHESIA PREPROCEDURE EVALUATION
09/21/2017    Pre-operative evaluation for REPAIR VALVE-MITRAL (N/A)    Karolina Jacobs is a 52 y.o. female with a pmhx of HTN, MVP, anemia and MR who was recently evaluated by CTS for MAGDALENO. She is now presenting for procedure noted above.     Past Medical History:   Diagnosis Date    MAGDALENO (dyspnea on exertion) 8/22/2017    Essential hypertension 8/22/2017    Iron deficiency anemia due to chronic blood loss 8/22/2017    Non-rheumatic mitral regurgitation 8/22/2017     No past surgical history on file.      Vital Signs Range (Last 24H):         CBC:     Recent Labs  Lab 09/21/17  1217   WBC 7.07   RBC 4.64   HGB 9.5*   HCT 30.9*      MCV 67*   MCH 20.5*   MCHC 30.7*       CMP:   Recent Labs  Lab 09/21/17  1217      K 4.1      CO2 25   BUN 11   CREATININE 0.9   GLU 73   CALCIUM 9.3   ALBUMIN 3.6   PROT 7.4   ALKPHOS 87   ALT 12   AST 17   BILITOT 0.4     INR:    Recent Labs  Lab 09/21/17  1217   INR 1.0   APTT 22.2     Diagnostic Studies:      EKG:    BARBRA(8/11/17):  Normal LVEF, MVP with small PFO, moderate MR    Anesthesia Evaluation    I have reviewed the Patient Summary Reports.    I have reviewed the Nursing Notes.   I have reviewed the Medications.     Review of Systems  Anesthesia Hx:  No problems with previous Anesthesia Hx tubal ligation   Social:  Non-Smoker, No Alcohol Use    Hematology/Oncology:  Hematology Normal   Oncology Normal     EENT/Dental:  EENT/Dental Normal  Jaw Problems: Clinches teeth and patient reports her mouth hurts if she tries to leave her mouth open wide  Cardiovascular:   Hypertension Valvular problems/Murmurs (dx at 24 years of age), MVP, MR MAGDALENO ECG has been reviewed. Self care, walks, can climb a flight of stairs.  Has to stop to catch her breath.  Denies chest pain   Pulmonary:  Pulmonary Normal Shortness of breath: at rest and exertion.     Renal/:  Renal/ Normal     Hepatic/GI:  Hepatic/GI Normal    Musculoskeletal:  Musculoskeletal Normal Occasional neck and back pain. Denies Musculoskeletal General/Symptoms    Neurological:  Neurology Normal  Denies CVA. Headaches (occipatal migranes) Denies Seizures.  Pain , onset is chronic , location of neck and back , quality of aching/dull , precipitating factors are activity    Endocrine:  Endocrine Normal    Dermatological:  Skin Normal    Psych:  Psychiatric Normal           Physical Exam  General:  Well nourished    Airway/Jaw/Neck:  Airway Findings: Mouth Opening: Normal Tongue: Normal  General Airway Assessment: Adult  Jaw/Neck Findings: (patient reports holding her head to one side for a long perior of times causes migranes)  Neck ROM: Normal ROM      Dental:  Dental Findings: (one of the front teeth was chipped and repaired) molar caps   Chest/Lungs:  Chest/Lungs Findings: Clear to auscultation, Normal Respiratory Rate     Heart/Vascular:  Heart Findings: Rate: Normal  Rhythm: Regular Rhythm  Sounds: Normal  Heart Murmur        Mental Status:  Mental Status Findings:  Cooperative, Alert and Oriented         Labs and ekg reviewed    Nga Watkins RN 09/21/2017        Anesthesia Plan  Type of Anesthesia, risks & benefits discussed:  Anesthesia Type:  general  Patient's Preference: General  Intra-op Monitoring Plan: standard ASA monitors  Intra-op Monitoring Plan Comments:   Post Op Pain Control Plan:   Post Op Pain Control Plan Comments: IV pain meds per primary service with On que pain pump  Induction:   IV  Beta Blocker:  Patient is on a Beta-Blocker and has received one dose within the past 24 hours (No further documentation required).       Informed Consent: Patient understands risks and agrees with Anesthesia plan.  Questions answered. Anesthesia consent signed with patient.  ASA Score: 4     Day of Surgery Review of History & Physical:    H&P update referred to the surgeon.     Anesthesia  Plan Notes: Discussed plan for awake arterial line, central line, intraoperative BARBRA, and post operative ICU Care        Ready For Surgery From Anesthesia Perspective.

## 2017-09-25 ENCOUNTER — HOSPITAL ENCOUNTER (INPATIENT)
Facility: HOSPITAL | Age: 52
LOS: 3 days | Discharge: HOME | DRG: 221 | End: 2017-09-28
Attending: THORACIC SURGERY (CARDIOTHORACIC VASCULAR SURGERY) | Admitting: THORACIC SURGERY (CARDIOTHORACIC VASCULAR SURGERY)
Payer: COMMERCIAL

## 2017-09-25 ENCOUNTER — ANESTHESIA (OUTPATIENT)
Dept: SURGERY | Facility: HOSPITAL | Age: 52
DRG: 221 | End: 2017-09-25
Payer: COMMERCIAL

## 2017-09-25 ENCOUNTER — SURGERY (OUTPATIENT)
Age: 52
End: 2017-09-25

## 2017-09-25 DIAGNOSIS — I49.9 ARRHYTHMIA: ICD-10-CM

## 2017-09-25 DIAGNOSIS — Z98.890 HISTORY OF HEART SURGERY: ICD-10-CM

## 2017-09-25 DIAGNOSIS — R73.9 HYPERGLYCEMIA: ICD-10-CM

## 2017-09-25 DIAGNOSIS — I34.0 MITRAL REGURGITATION: ICD-10-CM

## 2017-09-25 DIAGNOSIS — Z95.2 HEART VALVE REPLACED: ICD-10-CM

## 2017-09-25 DIAGNOSIS — Z98.890 S/P MITRAL VALVE REPAIR: ICD-10-CM

## 2017-09-25 DIAGNOSIS — Z99.11 ENCOUNTER FOR WEANING FROM VENTILATOR: ICD-10-CM

## 2017-09-25 DIAGNOSIS — I34.0 NON-RHEUMATIC MITRAL REGURGITATION: ICD-10-CM

## 2017-09-25 DIAGNOSIS — I34.0 MITRAL VALVE INSUFFICIENCY, UNSPECIFIED ETIOLOGY: ICD-10-CM

## 2017-09-25 LAB
ALLENS TEST: ABNORMAL
ALLENS TEST: ABNORMAL
ANION GAP SERPL CALC-SCNC: 7 MMOL/L
ANISOCYTOSIS BLD QL SMEAR: SLIGHT
APTT BLDCRRT: 24.7 SEC
B-HCG UR QL: NEGATIVE
BASOPHILS # BLD AUTO: 0.02 K/UL
BASOPHILS NFR BLD: 0.2 %
BUN SERPL-MCNC: 6 MG/DL
CALCIUM SERPL-MCNC: 7.8 MG/DL
CHLORIDE SERPL-SCNC: 110 MMOL/L
CO2 SERPL-SCNC: 23 MMOL/L
CREAT SERPL-MCNC: 0.7 MG/DL
CTP QC/QA: YES
DELSYS: ABNORMAL
DELSYS: ABNORMAL
DIFFERENTIAL METHOD: ABNORMAL
EOSINOPHIL # BLD AUTO: 0.1 K/UL
EOSINOPHIL NFR BLD: 0.5 %
ERYTHROCYTE [DISTWIDTH] IN BLOOD BY AUTOMATED COUNT: 15.4 %
ERYTHROCYTE [SEDIMENTATION RATE] IN BLOOD BY WESTERGREN METHOD: 14 MM/H
ERYTHROCYTE [SEDIMENTATION RATE] IN BLOOD BY WESTERGREN METHOD: 14 MM/H
EST. GFR  (AFRICAN AMERICAN): >60 ML/MIN/1.73 M^2
EST. GFR  (NON AFRICAN AMERICAN): >60 ML/MIN/1.73 M^2
FIO2: 0.7
FIO2: 0.8
FIO2: 100
FIO2: 100
GIANT PLATELETS BLD QL SMEAR: PRESENT
GLUCOSE SERPL-MCNC: 153 MG/DL
GLUCOSE SERPL-MCNC: 171 MG/DL (ref 70–110)
GLUCOSE SERPL-MCNC: 199 MG/DL (ref 70–110)
GLUCOSE SERPL-MCNC: 204 MG/DL (ref 70–110)
GLUCOSE SERPL-MCNC: 208 MG/DL (ref 70–110)
HCO3 UR-SCNC: 23.4 MMOL/L (ref 24–28)
HCO3 UR-SCNC: 24.9 MMOL/L (ref 24–28)
HCO3 UR-SCNC: 25.2 MMOL/L (ref 24–28)
HCO3 UR-SCNC: 25.8 MMOL/L (ref 24–28)
HCO3 UR-SCNC: 26.3 MMOL/L (ref 24–28)
HCT VFR BLD AUTO: 25 %
HCT VFR BLD CALC: 22 %PCV (ref 36–54)
HCT VFR BLD CALC: 23 %PCV (ref 36–54)
HCT VFR BLD CALC: 24 %PCV (ref 36–54)
HGB BLD-MCNC: 7.7 G/DL
INR PPP: 1.2
LDH SERPL L TO P-CCNC: 1.73 MMOL/L (ref 0.36–1.25)
LYMPHOCYTES # BLD AUTO: 1.1 K/UL
LYMPHOCYTES NFR BLD: 8.6 %
MAGNESIUM SERPL-MCNC: 2.4 MG/DL
MAGNESIUM SERPL-MCNC: 2.4 MG/DL
MCH RBC QN AUTO: 20.8 PG
MCHC RBC AUTO-ENTMCNC: 30.8 G/DL
MCV RBC AUTO: 67 FL
MIN VOL: 8.9
MIN VOL: 8.9
MODE: ABNORMAL
MODE: ABNORMAL
MONOCYTES # BLD AUTO: 0.6 K/UL
MONOCYTES NFR BLD: 4.7 %
NEUTROPHILS # BLD AUTO: 11 K/UL
NEUTROPHILS NFR BLD: 86 %
OVALOCYTES BLD QL SMEAR: ABNORMAL
PCO2 BLDA: 39.1 MMHG (ref 35–45)
PCO2 BLDA: 40 MMHG (ref 35–45)
PCO2 BLDA: 40.9 MMHG (ref 35–45)
PCO2 BLDA: 41.8 MMHG (ref 35–45)
PCO2 BLDA: 43.5 MMHG (ref 35–45)
PEEP: 5
PEEP: 5
PH SMN: 7.37 [PH] (ref 7.35–7.45)
PH SMN: 7.39 [PH] (ref 7.35–7.45)
PH SMN: 7.4 [PH] (ref 7.35–7.45)
PH SMN: 7.41 [PH] (ref 7.35–7.45)
PH SMN: 7.42 [PH] (ref 7.35–7.45)
PHOSPHATE SERPL-MCNC: 2.2 MG/DL
PHOSPHATE SERPL-MCNC: 2.2 MG/DL
PHOSPHATE SERPL-MCNC: 2.7 MG/DL
PIP: 22
PIP: 22
PLATELET # BLD AUTO: 148 K/UL
PMV BLD AUTO: 11.1 FL
PO2 BLDA: 167 MMHG (ref 40–60)
PO2 BLDA: 307 MMHG (ref 80–100)
PO2 BLDA: 317 MMHG (ref 40–60)
PO2 BLDA: 369 MMHG (ref 80–100)
PO2 BLDA: 379 MMHG (ref 80–100)
POC BE: -2 MMOL/L
POC BE: 0 MMOL/L
POC BE: 0 MMOL/L
POC BE: 1 MMOL/L
POC BE: 2 MMOL/L
POC IONIZED CALCIUM: 1 MMOL/L (ref 1.06–1.42)
POC IONIZED CALCIUM: 1.03 MMOL/L (ref 1.06–1.42)
POC IONIZED CALCIUM: 1.04 MMOL/L (ref 1.06–1.42)
POC IONIZED CALCIUM: 1.05 MMOL/L (ref 1.06–1.42)
POC IONIZED CALCIUM: 1.16 MMOL/L (ref 1.06–1.42)
POC PCO2 TEMP: 35.8 MMHG
POC PCO2 TEMP: 36.3 MMHG
POC PCO2 TEMP: 37.6 MMHG
POC PH TEMP: 7.42
POC PH TEMP: 7.44
POC PH TEMP: 7.44
POC PO2 TEMP: 156 MMHG
POC PO2 TEMP: 295 MMHG
POC PO2 TEMP: 304 MMHG
POC SATURATED O2: 100 % (ref 95–100)
POC SATURATED O2: 99 % (ref 95–100)
POC TCO2: 25 MMOL/L (ref 24–29)
POC TCO2: 26 MMOL/L (ref 23–27)
POC TCO2: 26 MMOL/L (ref 24–29)
POC TCO2: 27 MMOL/L (ref 23–27)
POC TCO2: 28 MMOL/L (ref 23–27)
POC TEMPERATURE: ABNORMAL
POIKILOCYTOSIS BLD QL SMEAR: SLIGHT
POLYCHROMASIA BLD QL SMEAR: ABNORMAL
POTASSIUM BLD-SCNC: 3.8 MMOL/L (ref 3.5–5.1)
POTASSIUM BLD-SCNC: 4.7 MMOL/L (ref 3.5–5.1)
POTASSIUM BLD-SCNC: 4.8 MMOL/L (ref 3.5–5.1)
POTASSIUM BLD-SCNC: 5 MMOL/L (ref 3.5–5.1)
POTASSIUM BLD-SCNC: 5.3 MMOL/L (ref 3.5–5.1)
POTASSIUM SERPL-SCNC: 3.8 MMOL/L
POTASSIUM SERPL-SCNC: 3.9 MMOL/L
POTASSIUM SERPL-SCNC: 3.9 MMOL/L
PROTHROMBIN TIME: 12.9 SEC
RBC # BLD AUTO: 3.71 M/UL
SAMPLE: ABNORMAL
SITE: ABNORMAL
SITE: ABNORMAL
SODIUM BLD-SCNC: 138 MMOL/L (ref 136–145)
SODIUM BLD-SCNC: 139 MMOL/L (ref 136–145)
SODIUM BLD-SCNC: 141 MMOL/L (ref 136–145)
SODIUM SERPL-SCNC: 140 MMOL/L
SP02: 100
SP02: 100
VT: 450
VT: 450
WBC # BLD AUTO: 12.91 K/UL

## 2017-09-25 PROCEDURE — 99900035 HC TECH TIME PER 15 MIN (STAT)

## 2017-09-25 PROCEDURE — 85014 HEMATOCRIT: CPT

## 2017-09-25 PROCEDURE — 20000000 HC ICU ROOM

## 2017-09-25 PROCEDURE — 63600175 PHARM REV CODE 636 W HCPCS: Performed by: STUDENT IN AN ORGANIZED HEALTH CARE EDUCATION/TRAINING PROGRAM

## 2017-09-25 PROCEDURE — 37000008 HC ANESTHESIA 1ST 15 MINUTES: Performed by: THORACIC SURGERY (CARDIOTHORACIC VASCULAR SURGERY)

## 2017-09-25 PROCEDURE — 02UG0JZ SUPPLEMENT MITRAL VALVE WITH SYNTHETIC SUBSTITUTE, OPEN APPROACH: ICD-10-PCS | Performed by: THORACIC SURGERY (CARDIOTHORACIC VASCULAR SURGERY)

## 2017-09-25 PROCEDURE — 94799 UNLISTED PULMONARY SVC/PX: CPT

## 2017-09-25 PROCEDURE — 85520 HEPARIN ASSAY: CPT

## 2017-09-25 PROCEDURE — 82803 BLOOD GASES ANY COMBINATION: CPT

## 2017-09-25 PROCEDURE — 25000003 PHARM REV CODE 250: Performed by: STUDENT IN AN ORGANIZED HEALTH CARE EDUCATION/TRAINING PROGRAM

## 2017-09-25 PROCEDURE — 27201423 OPTIME MED/SURG SUP & DEVICES STERILE SUPPLY: Performed by: THORACIC SURGERY (CARDIOTHORACIC VASCULAR SURGERY)

## 2017-09-25 PROCEDURE — 88305 TISSUE EXAM BY PATHOLOGIST: CPT

## 2017-09-25 PROCEDURE — 36000713 HC OR TIME LEV V EA ADD 15 MIN: Performed by: THORACIC SURGERY (CARDIOTHORACIC VASCULAR SURGERY)

## 2017-09-25 PROCEDURE — 84132 ASSAY OF SERUM POTASSIUM: CPT | Mod: 91

## 2017-09-25 PROCEDURE — 85730 THROMBOPLASTIN TIME PARTIAL: CPT

## 2017-09-25 PROCEDURE — 27000175 HC ADULT BYPASS PUMP

## 2017-09-25 PROCEDURE — 85610 PROTHROMBIN TIME: CPT

## 2017-09-25 PROCEDURE — 27100021 HC MULTIPORT INFUSION MANIFOLD: Performed by: STUDENT IN AN ORGANIZED HEALTH CARE EDUCATION/TRAINING PROGRAM

## 2017-09-25 PROCEDURE — 84100 ASSAY OF PHOSPHORUS: CPT | Mod: 91

## 2017-09-25 PROCEDURE — 93312 ECHO TRANSESOPHAGEAL: CPT | Mod: 59,,, | Performed by: ANESTHESIOLOGY

## 2017-09-25 PROCEDURE — 94150 VITAL CAPACITY TEST: CPT

## 2017-09-25 PROCEDURE — 25000242 PHARM REV CODE 250 ALT 637 W/ HCPCS: Performed by: STUDENT IN AN ORGANIZED HEALTH CARE EDUCATION/TRAINING PROGRAM

## 2017-09-25 PROCEDURE — 84132 ASSAY OF SERUM POTASSIUM: CPT

## 2017-09-25 PROCEDURE — 36556 INSERT NON-TUNNEL CV CATH: CPT | Mod: 59,,, | Performed by: ANESTHESIOLOGY

## 2017-09-25 PROCEDURE — 27200750 HC INSULATED NEEDLE/ STIMUPLEX: Performed by: STUDENT IN AN ORGANIZED HEALTH CARE EDUCATION/TRAINING PROGRAM

## 2017-09-25 PROCEDURE — A4216 STERILE WATER/SALINE, 10 ML: HCPCS | Performed by: STUDENT IN AN ORGANIZED HEALTH CARE EDUCATION/TRAINING PROGRAM

## 2017-09-25 PROCEDURE — 27100025 HC TUBING, SET FLUID WARMER: Performed by: STUDENT IN AN ORGANIZED HEALTH CARE EDUCATION/TRAINING PROGRAM

## 2017-09-25 PROCEDURE — 02BG0ZZ EXCISION OF MITRAL VALVE, OPEN APPROACH: ICD-10-PCS | Performed by: THORACIC SURGERY (CARDIOTHORACIC VASCULAR SURGERY)

## 2017-09-25 PROCEDURE — 5A1221Z PERFORMANCE OF CARDIAC OUTPUT, CONTINUOUS: ICD-10-PCS | Performed by: THORACIC SURGERY (CARDIOTHORACIC VASCULAR SURGERY)

## 2017-09-25 PROCEDURE — 36000712 HC OR TIME LEV V 1ST 15 MIN: Performed by: THORACIC SURGERY (CARDIOTHORACIC VASCULAR SURGERY)

## 2017-09-25 PROCEDURE — D9220A PRA ANESTHESIA: Mod: ,,, | Performed by: ANESTHESIOLOGY

## 2017-09-25 PROCEDURE — 27201037 HC PRESSURE MONITORING SET UP

## 2017-09-25 PROCEDURE — 37799 UNLISTED PX VASCULAR SURGERY: CPT

## 2017-09-25 PROCEDURE — 94761 N-INVAS EAR/PLS OXIMETRY MLT: CPT

## 2017-09-25 PROCEDURE — 81025 URINE PREGNANCY TEST: CPT | Performed by: THORACIC SURGERY (CARDIOTHORACIC VASCULAR SURGERY)

## 2017-09-25 PROCEDURE — 27000191 HC C-V MONITORING

## 2017-09-25 PROCEDURE — 27200953 HC CARDIOPLEGIA SYSTEM

## 2017-09-25 PROCEDURE — 94640 AIRWAY INHALATION TREATMENT: CPT

## 2017-09-25 PROCEDURE — C1751 CATH, INF, PER/CENT/MIDLINE: HCPCS | Performed by: STUDENT IN AN ORGANIZED HEALTH CARE EDUCATION/TRAINING PROGRAM

## 2017-09-25 PROCEDURE — 94002 VENT MGMT INPAT INIT DAY: CPT

## 2017-09-25 PROCEDURE — 93005 ELECTROCARDIOGRAM TRACING: CPT

## 2017-09-25 PROCEDURE — 36592 COLLECT BLOOD FROM PICC: CPT

## 2017-09-25 PROCEDURE — 25000003 PHARM REV CODE 250: Performed by: THORACIC SURGERY (CARDIOTHORACIC VASCULAR SURGERY)

## 2017-09-25 PROCEDURE — 27100088 HC CELL SAVER

## 2017-09-25 PROCEDURE — 83735 ASSAY OF MAGNESIUM: CPT

## 2017-09-25 PROCEDURE — 33427 REPAIR OF MITRAL VALVE: CPT | Mod: ,,, | Performed by: THORACIC SURGERY (CARDIOTHORACIC VASCULAR SURGERY)

## 2017-09-25 PROCEDURE — 99222 1ST HOSP IP/OBS MODERATE 55: CPT | Mod: ,,, | Performed by: NURSE PRACTITIONER

## 2017-09-25 PROCEDURE — C1729 CATH, DRAINAGE: HCPCS | Performed by: THORACIC SURGERY (CARDIOTHORACIC VASCULAR SURGERY)

## 2017-09-25 PROCEDURE — 27200678 HC TRANSDUCER MONITOR KIT TRIPLE: Performed by: STUDENT IN AN ORGANIZED HEALTH CARE EDUCATION/TRAINING PROGRAM

## 2017-09-25 PROCEDURE — 85025 COMPLETE CBC W/AUTO DIFF WBC: CPT

## 2017-09-25 PROCEDURE — 88305 TISSUE EXAM BY PATHOLOGIST: CPT | Mod: 26,,,

## 2017-09-25 PROCEDURE — 37000009 HC ANESTHESIA EA ADD 15 MINS: Performed by: THORACIC SURGERY (CARDIOTHORACIC VASCULAR SURGERY)

## 2017-09-25 PROCEDURE — 36620 INSERTION CATHETER ARTERY: CPT | Mod: 59,,, | Performed by: ANESTHESIOLOGY

## 2017-09-25 PROCEDURE — 83605 ASSAY OF LACTIC ACID: CPT

## 2017-09-25 PROCEDURE — 84295 ASSAY OF SERUM SODIUM: CPT

## 2017-09-25 PROCEDURE — 99223 1ST HOSP IP/OBS HIGH 75: CPT | Mod: ,,, | Performed by: SURGERY

## 2017-09-25 PROCEDURE — 25000003 PHARM REV CODE 250: Performed by: NURSE PRACTITIONER

## 2017-09-25 PROCEDURE — 82330 ASSAY OF CALCIUM: CPT

## 2017-09-25 PROCEDURE — 27800595 HC HEART VALVES

## 2017-09-25 PROCEDURE — 80048 BASIC METABOLIC PNL TOTAL CA: CPT

## 2017-09-25 PROCEDURE — 93010 ELECTROCARDIOGRAM REPORT: CPT | Mod: ,,, | Performed by: INTERNAL MEDICINE

## 2017-09-25 DEVICE — BAND SIM FLEX 31MM: Type: IMPLANTABLE DEVICE | Site: HEART | Status: FUNCTIONAL

## 2017-09-25 RX ORDER — SODIUM CHLORIDE 9 MG/ML
INJECTION, SOLUTION INTRAVENOUS CONTINUOUS
Status: DISCONTINUED | OUTPATIENT
Start: 2017-09-25 | End: 2017-09-26

## 2017-09-25 RX ORDER — METOPROLOL TARTRATE 25 MG/1
12.5 TABLET ORAL EVERY 12 HOURS
Status: DISCONTINUED | OUTPATIENT
Start: 2017-09-25 | End: 2017-09-27

## 2017-09-25 RX ORDER — MAGNESIUM SULFATE HEPTAHYDRATE 40 MG/ML
2 INJECTION, SOLUTION INTRAVENOUS
Status: DISCONTINUED | OUTPATIENT
Start: 2017-09-25 | End: 2017-09-26

## 2017-09-25 RX ORDER — MIDAZOLAM HYDROCHLORIDE 1 MG/ML
INJECTION, SOLUTION INTRAMUSCULAR; INTRAVENOUS
Status: DISCONTINUED | OUTPATIENT
Start: 2017-09-25 | End: 2017-09-25

## 2017-09-25 RX ORDER — ALBUMIN HUMAN 50 G/1000ML
500 SOLUTION INTRAVENOUS ONCE AS NEEDED
Status: ACTIVE | OUTPATIENT
Start: 2017-09-25 | End: 2017-09-25

## 2017-09-25 RX ORDER — NICARDIPINE HYDROCHLORIDE 0.2 MG/ML
5 INJECTION INTRAVENOUS CONTINUOUS
Status: DISCONTINUED | OUTPATIENT
Start: 2017-09-25 | End: 2017-09-26

## 2017-09-25 RX ORDER — CEFAZOLIN SODIUM 1 G/3ML
INJECTION, POWDER, FOR SOLUTION INTRAMUSCULAR; INTRAVENOUS
Status: DISCONTINUED | OUTPATIENT
Start: 2017-09-25 | End: 2017-09-25

## 2017-09-25 RX ORDER — FENTANYL CITRATE 50 UG/ML
50 INJECTION, SOLUTION INTRAMUSCULAR; INTRAVENOUS
Status: DISCONTINUED | OUTPATIENT
Start: 2017-09-25 | End: 2017-09-25

## 2017-09-25 RX ORDER — PHENYLEPHRINE HYDROCHLORIDE 10 MG/ML
INJECTION INTRAVENOUS
Status: DISCONTINUED | OUTPATIENT
Start: 2017-09-25 | End: 2017-09-25

## 2017-09-25 RX ORDER — OXYCODONE HYDROCHLORIDE 5 MG/1
10 TABLET ORAL EVERY 4 HOURS PRN
Status: DISCONTINUED | OUTPATIENT
Start: 2017-09-25 | End: 2017-09-28

## 2017-09-25 RX ORDER — IPRATROPIUM BROMIDE AND ALBUTEROL SULFATE 2.5; .5 MG/3ML; MG/3ML
3 SOLUTION RESPIRATORY (INHALATION) EVERY 4 HOURS
Status: COMPLETED | OUTPATIENT
Start: 2017-09-25 | End: 2017-09-26

## 2017-09-25 RX ORDER — METOCLOPRAMIDE HYDROCHLORIDE 5 MG/ML
5 INJECTION INTRAMUSCULAR; INTRAVENOUS EVERY 6 HOURS PRN
Status: DISCONTINUED | OUTPATIENT
Start: 2017-09-25 | End: 2017-09-28

## 2017-09-25 RX ORDER — TRANEXAMIC ACID 100 MG/ML
INJECTION, SOLUTION INTRAVENOUS
Status: DISCONTINUED | OUTPATIENT
Start: 2017-09-25 | End: 2017-09-25

## 2017-09-25 RX ORDER — DEXTROSE MONOHYDRATE, SODIUM CHLORIDE, AND POTASSIUM CHLORIDE 50; 1.49; 4.5 G/1000ML; G/1000ML; G/1000ML
INJECTION, SOLUTION INTRAVENOUS CONTINUOUS
Status: DISCONTINUED | OUTPATIENT
Start: 2017-09-25 | End: 2017-09-27

## 2017-09-25 RX ORDER — ASPIRIN 300 MG/1
300 SUPPOSITORY RECTAL ONCE AS NEEDED
Status: ACTIVE | OUTPATIENT
Start: 2017-09-25 | End: 2017-09-25

## 2017-09-25 RX ORDER — MUPIROCIN 20 MG/G
1 OINTMENT TOPICAL 2 TIMES DAILY
Status: DISCONTINUED | OUTPATIENT
Start: 2017-09-25 | End: 2017-09-28 | Stop reason: HOSPADM

## 2017-09-25 RX ORDER — ACETAMINOPHEN 325 MG/1
650 TABLET ORAL EVERY 4 HOURS PRN
Status: DISCONTINUED | OUTPATIENT
Start: 2017-09-25 | End: 2017-09-28

## 2017-09-25 RX ORDER — OXYCODONE HYDROCHLORIDE 5 MG/1
5 TABLET ORAL EVERY 4 HOURS PRN
Status: DISCONTINUED | OUTPATIENT
Start: 2017-09-25 | End: 2017-09-28 | Stop reason: HOSPADM

## 2017-09-25 RX ORDER — MUPIROCIN 20 MG/G
1 OINTMENT TOPICAL
Status: COMPLETED | OUTPATIENT
Start: 2017-09-25 | End: 2017-09-25

## 2017-09-25 RX ORDER — FUROSEMIDE 10 MG/ML
20 INJECTION INTRAMUSCULAR; INTRAVENOUS 2 TIMES DAILY
Status: DISCONTINUED | OUTPATIENT
Start: 2017-09-25 | End: 2017-09-28 | Stop reason: HOSPADM

## 2017-09-25 RX ORDER — PANTOPRAZOLE SODIUM 40 MG/1
40 TABLET, DELAYED RELEASE ORAL
Status: DISCONTINUED | OUTPATIENT
Start: 2017-09-26 | End: 2017-09-25

## 2017-09-25 RX ORDER — ROCURONIUM BROMIDE 10 MG/ML
INJECTION, SOLUTION INTRAVENOUS
Status: DISCONTINUED | OUTPATIENT
Start: 2017-09-25 | End: 2017-09-25

## 2017-09-25 RX ORDER — PROPOFOL 10 MG/ML
VIAL (ML) INTRAVENOUS
Status: DISCONTINUED | OUTPATIENT
Start: 2017-09-25 | End: 2017-09-25

## 2017-09-25 RX ORDER — PROPOFOL 10 MG/ML
5 INJECTION, EMULSION INTRAVENOUS CONTINUOUS
Status: DISCONTINUED | OUTPATIENT
Start: 2017-09-25 | End: 2017-09-26

## 2017-09-25 RX ORDER — SODIUM CHLORIDE 9 MG/ML
INJECTION, SOLUTION INTRAVENOUS CONTINUOUS PRN
Status: DISCONTINUED | OUTPATIENT
Start: 2017-09-25 | End: 2017-09-25

## 2017-09-25 RX ORDER — DOCUSATE SODIUM 100 MG/1
100 CAPSULE, LIQUID FILLED ORAL 2 TIMES DAILY
Status: DISCONTINUED | OUTPATIENT
Start: 2017-09-25 | End: 2017-09-28

## 2017-09-25 RX ORDER — MUPIROCIN 20 MG/G
1 OINTMENT TOPICAL
Status: CANCELLED | OUTPATIENT
Start: 2017-09-25

## 2017-09-25 RX ORDER — KETAMINE HYDROCHLORIDE 100 MG/ML
INJECTION, SOLUTION INTRAMUSCULAR; INTRAVENOUS
Status: DISCONTINUED | OUTPATIENT
Start: 2017-09-25 | End: 2017-09-25

## 2017-09-25 RX ORDER — ASPIRIN 325 MG
325 TABLET ORAL DAILY
Status: DISCONTINUED | OUTPATIENT
Start: 2017-09-25 | End: 2017-09-28 | Stop reason: HOSPADM

## 2017-09-25 RX ORDER — HYDROMORPHONE HYDROCHLORIDE 1 MG/ML
0.5 INJECTION, SOLUTION INTRAMUSCULAR; INTRAVENOUS; SUBCUTANEOUS ONCE
Status: COMPLETED | OUTPATIENT
Start: 2017-09-25 | End: 2017-09-25

## 2017-09-25 RX ORDER — FENTANYL CITRATE 50 UG/ML
25 INJECTION, SOLUTION INTRAMUSCULAR; INTRAVENOUS
Status: DISCONTINUED | OUTPATIENT
Start: 2017-09-25 | End: 2017-09-25

## 2017-09-25 RX ORDER — ONDANSETRON 2 MG/ML
4 INJECTION INTRAMUSCULAR; INTRAVENOUS EVERY 12 HOURS PRN
Status: DISCONTINUED | OUTPATIENT
Start: 2017-09-25 | End: 2017-09-28

## 2017-09-25 RX ORDER — NITROGLYCERIN 5 MG/ML
INJECTION, SOLUTION INTRAVENOUS
Status: DISCONTINUED | OUTPATIENT
Start: 2017-09-25 | End: 2017-09-25

## 2017-09-25 RX ORDER — BISACODYL 10 MG
10 SUPPOSITORY, RECTAL RECTAL EVERY 12 HOURS PRN
Status: DISCONTINUED | OUTPATIENT
Start: 2017-09-25 | End: 2017-09-28

## 2017-09-25 RX ORDER — POTASSIUM CHLORIDE 14.9 MG/ML
60 INJECTION INTRAVENOUS
Status: DISCONTINUED | OUTPATIENT
Start: 2017-09-25 | End: 2017-09-26

## 2017-09-25 RX ORDER — IPRATROPIUM BROMIDE AND ALBUTEROL SULFATE 2.5; .5 MG/3ML; MG/3ML
3 SOLUTION RESPIRATORY (INHALATION) EVERY 4 HOURS PRN
Status: DISCONTINUED | OUTPATIENT
Start: 2017-09-25 | End: 2017-09-28

## 2017-09-25 RX ORDER — FENTANYL CITRATE 50 UG/ML
INJECTION, SOLUTION INTRAMUSCULAR; INTRAVENOUS
Status: DISCONTINUED | OUTPATIENT
Start: 2017-09-25 | End: 2017-09-25

## 2017-09-25 RX ORDER — POLYETHYLENE GLYCOL 3350 17 G/17G
17 POWDER, FOR SOLUTION ORAL DAILY
Status: DISCONTINUED | OUTPATIENT
Start: 2017-09-25 | End: 2017-09-28

## 2017-09-25 RX ORDER — LIDOCAINE HYDROCHLORIDE 10 MG/ML
1 INJECTION, SOLUTION EPIDURAL; INFILTRATION; INTRACAUDAL; PERINEURAL
Status: COMPLETED | OUTPATIENT
Start: 2017-09-25 | End: 2017-09-25

## 2017-09-25 RX ORDER — FENTANYL CITRATE 50 UG/ML
25 INJECTION, SOLUTION INTRAMUSCULAR; INTRAVENOUS
Status: DISCONTINUED | OUTPATIENT
Start: 2017-09-25 | End: 2017-09-26

## 2017-09-25 RX ORDER — ASPIRIN 325 MG
325 TABLET, DELAYED RELEASE (ENTERIC COATED) ORAL DAILY
Status: DISCONTINUED | OUTPATIENT
Start: 2017-09-25 | End: 2017-09-25

## 2017-09-25 RX ORDER — METOPROLOL TARTRATE 25 MG/1
25 TABLET, FILM COATED ORAL
Status: DISCONTINUED | OUTPATIENT
Start: 2017-09-25 | End: 2017-09-25 | Stop reason: HOSPADM

## 2017-09-25 RX ORDER — BACITRACIN 50000 [IU]/1
INJECTION, POWDER, FOR SOLUTION INTRAMUSCULAR
Status: DISCONTINUED | OUTPATIENT
Start: 2017-09-25 | End: 2017-09-25 | Stop reason: HOSPADM

## 2017-09-25 RX ORDER — SUCCINYLCHOLINE CHLORIDE 20 MG/ML
INJECTION INTRAMUSCULAR; INTRAVENOUS
Status: DISCONTINUED | OUTPATIENT
Start: 2017-09-25 | End: 2017-09-25

## 2017-09-25 RX ORDER — HEPARIN SODIUM 1000 [USP'U]/ML
INJECTION, SOLUTION INTRAVENOUS; SUBCUTANEOUS
Status: DISCONTINUED | OUTPATIENT
Start: 2017-09-25 | End: 2017-09-25

## 2017-09-25 RX ORDER — POTASSIUM CHLORIDE 20 MEQ/1
20 TABLET, EXTENDED RELEASE ORAL EVERY 12 HOURS
Status: DISCONTINUED | OUTPATIENT
Start: 2017-09-25 | End: 2017-09-28 | Stop reason: HOSPADM

## 2017-09-25 RX ORDER — ATORVASTATIN CALCIUM 20 MG/1
40 TABLET, FILM COATED ORAL NIGHTLY
Status: DISCONTINUED | OUTPATIENT
Start: 2017-09-25 | End: 2017-09-28 | Stop reason: HOSPADM

## 2017-09-25 RX ORDER — FENTANYL CITRATE 50 UG/ML
50 INJECTION, SOLUTION INTRAMUSCULAR; INTRAVENOUS
Status: DISCONTINUED | OUTPATIENT
Start: 2017-09-25 | End: 2017-09-26

## 2017-09-25 RX ORDER — POTASSIUM CHLORIDE 14.9 MG/ML
20 INJECTION INTRAVENOUS
Status: DISCONTINUED | OUTPATIENT
Start: 2017-09-25 | End: 2017-09-26

## 2017-09-25 RX ORDER — POTASSIUM CHLORIDE 29.8 MG/ML
40 INJECTION INTRAVENOUS
Status: DISCONTINUED | OUTPATIENT
Start: 2017-09-25 | End: 2017-09-26

## 2017-09-25 RX ORDER — SODIUM CHLORIDE 0.9 % (FLUSH) 0.9 %
3 SYRINGE (ML) INJECTION EVERY 8 HOURS
Status: DISCONTINUED | OUTPATIENT
Start: 2017-09-25 | End: 2017-09-28

## 2017-09-25 RX ORDER — PROTAMINE SULFATE 10 MG/ML
INJECTION, SOLUTION INTRAVENOUS
Status: DISCONTINUED | OUTPATIENT
Start: 2017-09-25 | End: 2017-09-25

## 2017-09-25 RX ORDER — CEFAZOLIN SODIUM 2 G/50ML
2 SOLUTION INTRAVENOUS
Status: COMPLETED | OUTPATIENT
Start: 2017-09-25 | End: 2017-09-27

## 2017-09-25 RX ORDER — PROPOFOL 10 MG/ML
VIAL (ML) INTRAVENOUS CONTINUOUS PRN
Status: DISCONTINUED | OUTPATIENT
Start: 2017-09-25 | End: 2017-09-25

## 2017-09-25 RX ORDER — FENTANYL CITRATE 50 UG/ML
25 INJECTION, SOLUTION INTRAMUSCULAR; INTRAVENOUS
Status: ACTIVE | OUTPATIENT
Start: 2017-09-25 | End: 2017-09-25

## 2017-09-25 RX ORDER — MAGNESIUM SULFATE HEPTAHYDRATE 40 MG/ML
4 INJECTION, SOLUTION INTRAVENOUS
Status: DISCONTINUED | OUTPATIENT
Start: 2017-09-25 | End: 2017-09-26

## 2017-09-25 RX ORDER — PANTOPRAZOLE SODIUM 40 MG/10ML
40 INJECTION, POWDER, LYOPHILIZED, FOR SOLUTION INTRAVENOUS DAILY
Status: DISCONTINUED | OUTPATIENT
Start: 2017-09-25 | End: 2017-09-27

## 2017-09-25 RX ORDER — LIDOCAINE HCL/PF 100 MG/5ML
SYRINGE (ML) INTRAVENOUS
Status: DISCONTINUED | OUTPATIENT
Start: 2017-09-25 | End: 2017-09-25

## 2017-09-25 RX ORDER — ASPIRIN 325 MG
325 TABLET ORAL ONCE
Status: COMPLETED | OUTPATIENT
Start: 2017-09-25 | End: 2017-09-25

## 2017-09-25 RX ADMIN — CEFAZOLIN SODIUM 2 G: 2 SOLUTION INTRAVENOUS at 08:09

## 2017-09-25 RX ADMIN — ROCURONIUM BROMIDE 30 MG: 10 INJECTION, SOLUTION INTRAVENOUS at 08:09

## 2017-09-25 RX ADMIN — NITROGLYCERIN 25 MCG: 5 INJECTION, SOLUTION INTRAVENOUS at 11:09

## 2017-09-25 RX ADMIN — CEFAZOLIN 2 G: 1 INJECTION, POWDER, FOR SOLUTION INTRAVENOUS at 12:09

## 2017-09-25 RX ADMIN — FUROSEMIDE 20 MG: 10 INJECTION, SOLUTION INTRAMUSCULAR; INTRAVENOUS at 06:09

## 2017-09-25 RX ADMIN — KETAMINE HYDROCHLORIDE 30 MG: 100 INJECTION, SOLUTION, CONCENTRATE INTRAMUSCULAR; INTRAVENOUS at 08:09

## 2017-09-25 RX ADMIN — ROCURONIUM BROMIDE 45 MG: 10 INJECTION, SOLUTION INTRAVENOUS at 07:09

## 2017-09-25 RX ADMIN — SODIUM CHLORIDE 1 UNITS/HR: 9 INJECTION, SOLUTION INTRAVENOUS at 02:09

## 2017-09-25 RX ADMIN — PROPOFOL 25 MCG/KG/MIN: 10 INJECTION, EMULSION INTRAVENOUS at 11:09

## 2017-09-25 RX ADMIN — OXYCODONE HYDROCHLORIDE 10 MG: 5 TABLET ORAL at 11:09

## 2017-09-25 RX ADMIN — PHENYLEPHRINE HYDROCHLORIDE 100 MCG: 10 INJECTION INTRAVENOUS at 07:09

## 2017-09-25 RX ADMIN — GELATIN ABSORBABLE SPONGE SIZE 100 1 APPLICATOR: MISC at 09:09

## 2017-09-25 RX ADMIN — SODIUM CHLORIDE: 0.9 INJECTION, SOLUTION INTRAVENOUS at 06:09

## 2017-09-25 RX ADMIN — ROCURONIUM BROMIDE 5 MG: 10 INJECTION, SOLUTION INTRAVENOUS at 07:09

## 2017-09-25 RX ADMIN — Medication 3 ML: at 02:09

## 2017-09-25 RX ADMIN — SODIUM CHLORIDE, SODIUM GLUCONATE, SODIUM ACETATE, POTASSIUM CHLORIDE, MAGNESIUM CHLORIDE, SODIUM PHOSPHATE, DIBASIC, AND POTASSIUM PHOSPHATE: .53; .5; .37; .037; .03; .012; .00082 INJECTION, SOLUTION INTRAVENOUS at 08:09

## 2017-09-25 RX ADMIN — SODIUM CHLORIDE, SODIUM GLUCONATE, SODIUM ACETATE, POTASSIUM CHLORIDE, MAGNESIUM CHLORIDE, SODIUM PHOSPHATE, DIBASIC, AND POTASSIUM PHOSPHATE: .53; .5; .37; .037; .03; .012; .00082 INJECTION, SOLUTION INTRAVENOUS at 10:09

## 2017-09-25 RX ADMIN — IPRATROPIUM BROMIDE AND ALBUTEROL SULFATE 3 ML: .5; 3 SOLUTION RESPIRATORY (INHALATION) at 03:09

## 2017-09-25 RX ADMIN — PROPOFOL 100 MG: 10 INJECTION, EMULSION INTRAVENOUS at 07:09

## 2017-09-25 RX ADMIN — Medication 3 ML: at 11:09

## 2017-09-25 RX ADMIN — DEXTROSE MONOHYDRATE, SODIUM CHLORIDE, AND POTASSIUM CHLORIDE: 50; 4.5; 1.49 INJECTION, SOLUTION INTRAVENOUS at 02:09

## 2017-09-25 RX ADMIN — NICARDIPINE HYDROCHLORIDE 1 MG/HR: 0.2 INJECTION, SOLUTION INTRAVENOUS at 02:09

## 2017-09-25 RX ADMIN — IPRATROPIUM BROMIDE AND ALBUTEROL SULFATE 3 ML: .5; 3 SOLUTION RESPIRATORY (INHALATION) at 07:09

## 2017-09-25 RX ADMIN — HYDROMORPHONE HYDROCHLORIDE 0.5 MG: 1 INJECTION, SOLUTION INTRAMUSCULAR; INTRAVENOUS; SUBCUTANEOUS at 06:09

## 2017-09-25 RX ADMIN — ROCURONIUM BROMIDE 20 MG: 10 INJECTION, SOLUTION INTRAVENOUS at 09:09

## 2017-09-25 RX ADMIN — HEPARIN SODIUM 25000 UNITS: 1000 INJECTION, SOLUTION INTRAVENOUS; SUBCUTANEOUS at 09:09

## 2017-09-25 RX ADMIN — POTASSIUM CHLORIDE 20 MEQ: 200 INJECTION, SOLUTION INTRAVENOUS at 08:09

## 2017-09-25 RX ADMIN — NICARDIPINE HYDROCHLORIDE 5 MG/HR: 0.2 INJECTION, SOLUTION INTRAVENOUS at 09:09

## 2017-09-25 RX ADMIN — LIDOCAINE HYDROCHLORIDE 100 MG: 20 INJECTION, SOLUTION INTRAVENOUS at 07:09

## 2017-09-25 RX ADMIN — EPINEPHRINE 0.02 MCG/KG/MIN: 1 INJECTION INTRAMUSCULAR; INTRAVENOUS; SUBCUTANEOUS at 11:09

## 2017-09-25 RX ADMIN — FENTANYL CITRATE 100 MCG: 50 INJECTION, SOLUTION INTRAMUSCULAR; INTRAVENOUS at 07:09

## 2017-09-25 RX ADMIN — MUPIROCIN 1 G: 20 OINTMENT TOPICAL at 08:09

## 2017-09-25 RX ADMIN — ONDANSETRON 4 MG: 2 INJECTION INTRAMUSCULAR; INTRAVENOUS at 07:09

## 2017-09-25 RX ADMIN — MUPIROCIN 1 G: 20 OINTMENT TOPICAL at 06:09

## 2017-09-25 RX ADMIN — ASPIRIN 325 MG ORAL TABLET 325 MG: 325 PILL ORAL at 02:09

## 2017-09-25 RX ADMIN — DIBASIC SODIUM PHOSPHATE, MONOBASIC POTASSIUM PHOSPHATE AND MONOBASIC SODIUM PHOSPHATE 2 TABLET: 852; 155; 130 TABLET ORAL at 08:09

## 2017-09-25 RX ADMIN — HYDROMORPHONE HYDROCHLORIDE 0.5 MG: 1 INJECTION, SOLUTION INTRAMUSCULAR; INTRAVENOUS; SUBCUTANEOUS at 08:09

## 2017-09-25 RX ADMIN — POTASSIUM CHLORIDE 20 MEQ: 1500 TABLET, EXTENDED RELEASE ORAL at 08:09

## 2017-09-25 RX ADMIN — PHENYLEPHRINE HYDROCHLORIDE 50 MCG: 10 INJECTION INTRAVENOUS at 09:09

## 2017-09-25 RX ADMIN — FENTANYL CITRATE 200 MCG: 50 INJECTION, SOLUTION INTRAMUSCULAR; INTRAVENOUS at 09:09

## 2017-09-25 RX ADMIN — SODIUM CHLORIDE: 0.9 INJECTION, SOLUTION INTRAVENOUS at 09:09

## 2017-09-25 RX ADMIN — METOPROLOL TARTRATE 12.5 MG: 25 TABLET, FILM COATED ORAL at 08:09

## 2017-09-25 RX ADMIN — OXYCODONE HYDROCHLORIDE 10 MG: 5 TABLET ORAL at 04:09

## 2017-09-25 RX ADMIN — CEFAZOLIN 2 G: 1 INJECTION, POWDER, FOR SOLUTION INTRAVENOUS at 08:09

## 2017-09-25 RX ADMIN — IPRATROPIUM BROMIDE AND ALBUTEROL SULFATE 3 ML: .5; 3 SOLUTION RESPIRATORY (INHALATION) at 11:09

## 2017-09-25 RX ADMIN — ROCURONIUM BROMIDE 50 MG: 10 INJECTION, SOLUTION INTRAVENOUS at 10:09

## 2017-09-25 RX ADMIN — POLYETHYLENE GLYCOL 3350 17 G: 17 POWDER, FOR SOLUTION ORAL at 03:09

## 2017-09-25 RX ADMIN — PROTAMINE SULFATE 200 MG: 10 INJECTION, SOLUTION INTRAVENOUS at 12:09

## 2017-09-25 RX ADMIN — FENTANYL CITRATE 100 MCG: 50 INJECTION, SOLUTION INTRAMUSCULAR; INTRAVENOUS at 11:09

## 2017-09-25 RX ADMIN — POTASSIUM CHLORIDE 20 MEQ: 200 INJECTION, SOLUTION INTRAVENOUS at 03:09

## 2017-09-25 RX ADMIN — MIDAZOLAM HYDROCHLORIDE 3 MG: 1 INJECTION, SOLUTION INTRAMUSCULAR; INTRAVENOUS at 07:09

## 2017-09-25 RX ADMIN — LIDOCAINE HYDROCHLORIDE 1 MG: 10 INJECTION, SOLUTION EPIDURAL; INFILTRATION; INTRACAUDAL; PERINEURAL at 06:09

## 2017-09-25 RX ADMIN — SUCCINYLCHOLINE CHLORIDE 120 MG: 20 INJECTION, SOLUTION INTRAMUSCULAR; INTRAVENOUS at 07:09

## 2017-09-25 RX ADMIN — TRANEXAMIC ACID 600 MG: 100 INJECTION, SOLUTION INTRAVENOUS at 08:09

## 2017-09-25 RX ADMIN — MIDAZOLAM HYDROCHLORIDE 1 MG: 1 INJECTION, SOLUTION INTRAMUSCULAR; INTRAVENOUS at 11:09

## 2017-09-25 RX ADMIN — BACITRACIN 50000 UNITS: 50000 INJECTION, POWDER, LYOPHILIZED, FOR SOLUTION INTRAMUSCULAR at 09:09

## 2017-09-25 RX ADMIN — ATORVASTATIN CALCIUM 40 MG: 20 TABLET, FILM COATED ORAL at 08:09

## 2017-09-25 RX ADMIN — MIDAZOLAM HYDROCHLORIDE 2 MG: 1 INJECTION, SOLUTION INTRAMUSCULAR; INTRAVENOUS at 10:09

## 2017-09-25 RX ADMIN — DOCUSATE SODIUM 100 MG: 100 CAPSULE, LIQUID FILLED ORAL at 08:09

## 2017-09-25 RX ADMIN — ASPIRIN 325 MG ORAL TABLET 325 MG: 325 PILL ORAL at 03:09

## 2017-09-25 RX ADMIN — LIDOCAINE HYDROCHLORIDE 100 MG: 20 INJECTION, SOLUTION INTRAVENOUS at 11:09

## 2017-09-25 NOTE — BRIEF OP NOTE
[facility]  Cardiothoracic Surgery  Operative Note    SUMMARY     Date of Procedure: 9/25/2017     Procedure: Procedure(s) (LRB):  REPAIR VALVE-MITRAL with triangular resection of P2 and 31 mm Medtronic ATS Simulus band annuloplasty  [id number]    Surgeon(s) and Role:     * Philip Russell MD - Primary    Assisting Surgeon: None    Pre-Operative Diagnosis: Non-rheumatic mitral regurgitation [I34.0]    Post-Operative Diagnosis: Post-Op Diagnosis Codes:     * Non-rheumatic mitral regurgitation [I34.0]    Anesthesia: General      Description of the Findings of the Procedure: Flail P2. No MR on post BARBRA.        Complications: None    Estimated Blood Loss (EBL): 100 mL           Implants:   Implant Name Type Inv. Item Serial No.  Lot No. LRB No. Used   BAND SIM FLEX 31MM - [id number]   BAND SIM FLEX 31MM   [employer] [facility] [id number] N/A 1       Specimens:   Specimen (12h ago through future)    Start     Ordered    09/25/17 1153  Specimen to Pathology - Surgery  Once     Comments:  1. P2 scallop of posterior leaflet of mitral valve (permanent)      09/25/17 1153                  Attestation:  I was present and scrubbed for the entire procedure.

## 2017-09-25 NOTE — SUBJECTIVE & OBJECTIVE
Interval HPI:   Intubated. BGs [phone] since arrival to ICU. Hasn't started insulin infusion yet. No hypoglycemia. Currently afebrile.     PMH, PSH, FH, SH updated and reviewed     Review of Systems  Unable to obtain due to: Intubation, but reviewed ROS from note dated 9/21/17 by Dr. Russell    Current Medications and/or Treatments Impacting Glycemic Control  Immunotherapy:    Immunosuppressants     None        Steroids:   Hormones     None        Pressors:    Autonomic Drugs     Start     Stop Route Frequency Ordered    09/25/17 1330  epinephrine 4 mg in sodium chloride 0.9% 250 mL infusion     Question Answer Comment   Titrate by: (in mcg/kg/min) 0.02    Titrate interval: (in minutes) 5    Titrate to maintain: (SBP or MAP or Cardiac Index) MAP    Greater than: (in mmHg) 65    Cardiac index greater than: (in L/min) 2.2    Maximum dose: (in mcg/kg/min) 2        -- IV Continuous 09/25/17 1326    09/25/17 1325  metoprolol tartrate (LOPRESSOR) split tablet 12.5 mg      -- Oral Every 12 hours 09/25/17 1325        Hyperglycemia/Diabetes Medications:   Antihyperglycemics     Start     Stop Route Frequency Ordered    09/25/17 1330  insulin regular (Humulin R) 100 Units in sodium chloride 0.9% 100 mL infusion      -- IV Continuous 09/25/17 1326          PHYSICAL EXAMINATION:Vitals:    09/25/17 1400   BP: (!) 107/52   Pulse: 90   Resp: 10   Temp:      Body mass index is 32.28 kg/m².    Physical Exam   Constitutional:  Well developed, well nourished, NAD.  ENT: External ears no masses with nose patent; normal hearing.   Neck: trachea midline; no thyromegaly.   Cardiovascular: Normal heart sounds, no LE edema.     Lungs:  intubated; lungs anterior bilaterally clear to auscultation.  Abdomen:  Soft, no masses,  no hernias.  MS: No clubbing or cyanosis of nails noted; unable to assess gait.  Skin: No rashes, lesions, or ulcers; no nodules.

## 2017-09-25 NOTE — ANESTHESIA PROCEDURE NOTES
Central Line    Diagnosis: mitral regurgitation  Doctor requesting consult: Yvonne  Patient location during procedure: done in OR  Procedure start time: 9/25/2017 7:42 AM  Timeout: 9/25/2017 7:41 AM  Procedure end time: 9/25/2017 7:58 AM  Staffing  Anesthesiologist: DANISH MCNEILL  Resident/CRNA: JORDY CARY  Performed: resident/CRNA   Anesthesiologist was present at the time of the procedure.  Preanesthetic Checklist  Completed: patient identified, site marked, surgical consent, pre-op evaluation, timeout performed, IV checked, risks and benefits discussed, monitors and equipment checked and anesthesia consent given  Indication  Indication: hemodynamic monitoring, vascular access, med administration     Anesthesia   general anesthesia    Central Line  Skin Prep: skin prepped with ChloraPrep, skin prep agent completely dried prior to procedure  maximum sterile barriers used during central venous catheter insertion  hand hygiene performed prior to central venous catheter insertion  Location: right internal jugular,   Catheter type: quad lumen  Catheter Size: 8.5 Fr  Inserted Catheter Length: 14 cm  Ultrasound: vascular probe with ultrasound  Vessel Caliber: small, large, patent, compressibility normal  Needle advanced into vessel with real time Ultrasound guidance.  Sterile sheath used.   Manometry: Venous cannualation confirmed by visual estimation of blood vessel pressure using manometry.  Insertion Attempts: 1   Securement:line sutured, chlorhexidine patch, sterile dressing applied and blood return through all ports     Post-Procedure  Adverse Events:none

## 2017-09-25 NOTE — ANESTHESIA PROCEDURE NOTES
Bilateral Transversus Thoracis Plane Block    Patient location during procedure: OR   Block not for primary anesthetic.  Reason for block: at surgeon's request and post-op pain management   Post-op Pain Location: Sternum  Start time: 9/25/2017 7:50 AM  Timeout: 9/25/2017 7:49 AM   End time: 9/25/2017 7:57 AM  Staffing  Anesthesiologist: DANISH MCNEILL  Performed: anesthesiologist   Preanesthetic Checklist  Completed: patient identified, site marked, surgical consent, pre-op evaluation, timeout performed, IV checked, risks and benefits discussed and monitors and equipment checked  Peripheral Block  Patient position: supine  Prep: ChloraPrep  Patient monitoring: heart rate, cardiac monitor, continuous pulse ox, continuous capnometry and frequent blood pressure checks  Anesthesia block type: Transverus Thoracis Plane.  Laterality: bilateral  Injection technique: single shot  Needle  Needle type: Stimuplex   Needle gauge: 22 G  Needle length: 2 in  Needle localization: ultrasound guidance   -ultrasound image captured on disc.  Assessment  Injection assessment: negative aspiration  Heart rate change: no  Slow fractionated injection: yes  Medications:  Bolus administered: 30 mL of 0.5 bupivacaine  Epinephrine added: 3.75 mcg/mL (1/300,000)  Additional Notes  15cc 0.5 Bupivicaine with 1/300,000 epinephrine given under ultrasound guidance bilaterally for 30 cc total.  No sign of intravascular injection or pneumothorax

## 2017-09-25 NOTE — ANESTHESIA PROCEDURE NOTES
Final BARBRA    Diagnosis: Mitral regurgitation  Patient location during procedure: OR  Procedure start time: 9/25/2017 12:01 PM  Timeout: 9/25/2017 12:00 PM  Procedure end time: 9/25/2017 12:30 PM  Surgery related to: mitral regurgitation  Exam type: Final  Staffing  Anesthesiologist: DANISH MCNEILL  Other anesthesia staff: ADA BORJAS  Performed: anesthesiologist and other anesthesia staff   Preanesthetic Checklist  Completed: patient identified, surgical consent, pre-op evaluation, timeout performed, risks and benefits discussed, monitors and equipment checked, anesthesia consent given, oxygen available, suction available, hand hygiene performed and patient being monitored    Exam  Left Atrium: dilated   Estimated Ejection Fraction: > 55% normal  Regional Wall Abnormalities: no RWMA        Right Heart  Right Ventricle dilated: no  Right Ventricle Function: normal      Aortic Valve:  Prosthesis: none  Perivalvular leak: no  Regurgitation(color flow): 0-tr     Mitral Valve:  Prosthesis: ring  Perivalvlular Leak: no  Regurgitation(color flow): 0-tr   Mitral Stenosis Mean Gradient: 2 mmhg    Tricuspid Valve:  Prosthesis: none  Regurgitation: 0-tr    Pulmonic Valve:  Prosthesis: none  Regurgitation(color flow): 0-tr      Aorta  Descending Aorta dissection: no  Descending aorta IABP: no  Aortic Arch Dissection: no  Ascending Aorta Dissection: no    LVAD:no        Effusions    Summary    Other Findings  Mitral repair ring in proper position, no abnormal motion, no perivalvular leak, mean gradient of 2mmHg  Normal biventricular function

## 2017-09-25 NOTE — TRANSFER OF CARE
"Anesthesia Transfer of Care Note    Patient: Karolina Jacobs    Procedure(s) Performed: Procedure(s) (LRB):  REPAIR VALVE-MITRAL (N/A)    Patient location: ICU    Anesthesia Type: general    Transport from OR: Transported from OR intubated on 100% O2 by AMBU with adequate controlled ventilation. Upon arrival to PACU/ICU, patient attached to ventilator and auscultated to confirm bilateral breath sounds and adequate TV. Continuous ECG monitoring in transport. Continuous SpO2 monitoring in transport. Continuos invasive BP monitoring in transport    Post pain: adequate analgesia    Post assessment: no apparent anesthetic complications and tolerated procedure well    Post vital signs: stable    Level of consciousness: sedated    Nausea/Vomiting: no nausea/vomiting    Complications: none    Transfer of care protocol was followed      Last vitals:   Visit Vitals  /71 (BP Location: Left arm, Patient Position: Lying)   Pulse 80   Temp 36.9 °C (98.4 °F) (Oral)   Resp 16   Ht 5' 6" (1.676 m)   Wt 90.7 kg (200 lb)   LMP 09/18/2017   SpO2 100%   Breastfeeding? No   BMI 32.28 kg/m²     "

## 2017-09-25 NOTE — ANESTHESIA PROCEDURE NOTES
Baseline BARBRA    Diagnosis: mitral regurgitation  Patient location during procedure: OR  Procedure start time: 9/25/2017 8:46 AM  Timeout: 9/25/2017 8:45 AM  Procedure end time: 9/25/2017 8:55 AM  Surgery related to: mitral regurgitation  Exam type: Baseline  Staffing  Anesthesiologist: DANISH MCNEILL  Other anesthesia staff: ADA BORJAS  Performed: anesthesiologist and other anesthesia staff   Preanesthetic Checklist  Completed: patient identified, surgical consent, pre-op evaluation, timeout performed, risks and benefits discussed, monitors and equipment checked, anesthesia consent given, oxygen available, suction available, hand hygiene performed and patient being monitored  Setup & Induction  Patient preparation: bite block inserted  Probe Insertion: easy  Exam: complete  Exam     Left Heart  Left Atruim: severly enlarged (men >5.2; women >4.7) and dilated coronary sinus        LVAD:no  Estimated Ejection Fraction: > 55% normal  Regional Wall Abnormalities: no RWMA            Right Heart  Right Ventricle: normal  Right Ventricle Function: normal    Intra Atrial Septum  PFO: no shunt by color flow doppler  no IAS aneurysm  no lipomatous hypertrophy  no Atrial Septal Defect (Asd)    Right Ventricle  Size: normal, Free Wall Thickness: normal    Aortic Valve:  Stenosis: none  Morphology: trileaflet  Regurgitation: no aortic valve regurgitation     Mitral Valve:  Morphology:normal  Prolapse: P2  Flail: no flail  Jet Description: anteriorly directed jet, severe and eccentric    Tricuspid Valve:  Morphology: normal  Regurgitation: none    Pulmonic Valve:  Morphology:normal  Regurgitation(color flow): mild    Great Vessels  Ascending Aorta Atherosclerosis: 1=nl-min dz  Aortic Arch Atherosclerosis: 1=nl-min dz  IABP: no  Descending Aorta Atherosclerosis: 1=nl-min dz  Aorta    Descending aorta IABP: no    Effusions  Effusions: none    Summary  Findings discussed with surgeon.    Other Findings   Normal biventricular  size and function  Prolapsed P2 with eccentric jet, reversal of flow in pulmonic veins and enlarged LA  All other valves structurally and functionally normal  No PFO

## 2017-09-25 NOTE — H&P
History & Physical  Surgical Intensive Care    SUBJECTIVE:     Chief Complaint/Reason for Admission: Post-op mitral valve repair with triangular resection of P2 and 31mm medtronic ATS simulus band annuloplasty     History of Present Illness:  Karolina Jacobs is a 52 y.o. female who present with complaints of dyspnea on exertion. PMHx of mitral valve insufficiency in 2013, anemia, HTN. She was told for several years prior to that she had mitral valve prolapse, but no echocardiography was done.  She has developed significant progressive exertional dyspnea with fewer and lighter activities. She has undergone left and right heart catheterization as well as transesophageal echocardiography.     Patient arrived to the SICU intubated and sedated. HDS off pressor support. Propofol gtt running. Midline sternotomy covered in gauze. Continue ICU care per primary team.     PTA Medications   Medication Sig    alprazolam (XANAX) 0.5 MG tablet     aspirin (ECOTRIN) 81 MG EC tablet Take 81 mg by mouth.    carvedilol (COREG) 25 MG tablet     fluoxetine 10 MG Tab        Review of patient's allergies indicates:   Allergen Reactions    Sulfa (sulfonamide antibiotics) Other (See Comments)     Unknown, pt was a child when she had reaction       Past Medical History:   Diagnosis Date    MAGDALENO (dyspnea on exertion) 8/22/2017    Essential hypertension 8/22/2017    Iron deficiency anemia due to chronic blood loss 8/22/2017    Non-rheumatic mitral regurgitation 8/22/2017     Past Surgical History:   Procedure Laterality Date    TUBAL LIGATION  1990's     History reviewed. No pertinent family history.  Social History   Substance Use Topics    Smoking status: Never Smoker    Smokeless tobacco: Never Used    Alcohol use No        Review of Systems:  Review of Systems   Unable to perform ROS: Intubated         OBJECTIVE:     Vital Signs (Most Recent)  Temp: 97.6 °F (36.4 °C) (09/25/17 1318)  Pulse: 90 (09/25/17 1400)  Resp: 10  (09/25/17 1400)  BP: (!) 107/52 (09/25/17 1400)  SpO2: 100 % (09/25/17 1400)  Ventilator Data (Last 24H):     Vent Mode: A/C  Oxygen Concentration (%):  [] 40  Resp Rate Total:  [18 br/min-21 br/min] 18 br/min  Vt Set:  [450 mL] 450 mL  PEEP/CPAP:  [5 cmH20] 5 cmH20  Pressure Support:  [0 cmH20] 0 cmH20  Mean Airway Pressure:  [10 cmH20] 10 cmH20    Hemodynamic Parameters (Last 24H):       Physical Exam   Constitutional: She appears well-developed and well-nourished.   HENT:   Head: Normocephalic and atraumatic.   Cardiovascular: Normal rate, regular rhythm and normal heart sounds.    Pulmonary/Chest: Effort normal and breath sounds normal. She has no wheezes. She has no rales.   Intubated.   Abdominal: Soft. She exhibits no distension. There is no tenderness.   Neurological:   Intubated and sedated.   Skin: Skin is warm and dry.       Lines/Drains:       Peripheral IV - Single Lumen 09/25/17 0558 Left Forearm (Active)   Site Assessment Clean;Dry;Intact 9/25/2017  5:58 AM   Line Status Blood return noted;Saline locked 9/25/2017  5:58 AM   Dressing Status Clean;Dry;Intact 9/25/2017  5:58 AM   Dressing Intervention New dressing 9/25/2017  5:58 AM   Number of days: 0            Urethral Catheter 09/25/17 0740 Non-latex;Straight-tip;Temperature probe 16 Fr. (Active)   Output (mL) 450 mL 9/25/2017  1:00 PM   Number of days: 0            Y Chest Tube 1 and 2 09/25/17 1200 1 Mediastinal 19 Fr. 2 Mediastinal 19 Fr. (Active)   Output (mL) 15 mL 9/25/2017  1:00 PM   Number of days: 0       Laboratory  CBC:   Recent Labs  Lab 09/21/17  1217  09/25/17  1321   WBC 7.07  --   --    RBC 4.64  --   --    HGB 9.5*  --   --    HCT 30.9*  < > 24*     --   --    MCV 67*  --   --    MCH 20.5*  --   --    MCHC 30.7*  --   --    < > = values in this interval not displayed.  CMP:   Recent Labs  Lab 09/21/17  1217   GLU 73   CALCIUM 9.3   ALBUMIN 3.6   PROT 7.4      K 4.1   CO2 25      BUN 11   CREATININE 0.9    ALKPHOS 87   ALT 12   AST 17   BILITOT 0.4     Coagulation:   Recent Labs  Lab 09/21/17  1217   LABPROT 10.4   INR 1.0   APTT 22.2     Cardiac markers: No results for input(s): CKMB, CPKMB, TROPONINT, TROPONINI, MYOGLOBIN in the last 168 hours.  ABGs:   Recent Labs  Lab 09/25/17  1321   PH 7.417   PCO2 40.0   PO2 369*   HCO3 25.8   POCSATURATED 100   BE 1       Chest X-Ray: No results found in the last 24 hours.      ASSESSMENT/PLAN:       Plan:    Neuro:   -Pain control: acetaminophen PRN, fentanyl IV PRN, Oxy IR PRN  -Sedation propofol/precedex rate  -Daily sedation vacations    Pulmonary:   -extubated/intubated currently on room air/NC/non rebreather    Vent Mode: A/C  Oxygen Concentration (%):  [] 40  Resp Rate Total:  [18 br/min-21 br/min] 18 br/min  Vt Set:  [450 mL] 450 mL  PEEP/CPAP:  [5 cmH20] 5 cmH20  Pressure Support:  [0 cmH20] 0 cmH20  Mean Airway Pressure:  [10 cmH20] 10 cmH20    Recent Labs  Lab 09/25/17  1321   PH 7.417   PCO2 40.0   PO2 369*   HCO3 25.8   POCSATURATED 100   BE 1     -Daily SBT  -Chest tubes -    Cardiac:  -MAP goal >65  -HDS not requiring pressors  - cardene gtt    Renal:   -Rivas in place  -monitor UOP   -Bun/Cr stable, continue to trend    Fluids/Electrolytes/Nutrition/GI:   -Nutritional status: NPO  -replace lytes PRN  -maintenance fluids + NS @15ml/hr  -Bowel regimen per primary team    Hematology/Oncology:  -H/H stable, continue to trend  -INR/Plts 1.0/292  -Anticoagulation: aspirin    Infectious Disease:   -Afebrile  -WBC WNL  -Abx per primary team    Endocrine:  -Glucose goal of 120-150  -SSI    Dispo:  -Continue care in the ICU setting per primary team    Ondina Muse, PGY-1  General Surgery  [phone]  09/25/2017

## 2017-09-25 NOTE — OP NOTE
DATE OF PROCEDURE:  09/25/2017    ATTENDING SURGEON:  Philip Russell M.D.    PREOPERATIVE DIAGNOSES:  1.  Severe mitral regurgitation.  2. Hypertension.  3.  Iron deficiency anemia due to chronic blood loss.  4.  Obesity.    POSTOPERATIVE DIAGNOSES:  1.  Severe mitral regurgitation.  2. Hypertension.  3.  Iron deficiency anemia due to chronic blood loss.  4.  Obesity.    OPERATION PERFORMED:  Mitral valve repair with triangular resection of the P2   scallop of the posterior leaflet of the mitral valve and 31 mm Medtronic ATS   Simulus band annuloplasty.    ANESTHESIA:  General endotracheal.    ESTIMATED BLOOD LOSS:  100 mL.    BRIEF HISTORY:  Ms. Jacobs is a 52-year-old woman who initially presented with   severe dyspnea on exertion.  She had been known to have mitral regurgitation   since 2013.  She also had a history of mitral valve prolapse, but did not recall   having had a previous echo.  She had progressive dyspnea on exertion and   underwent a thoughtful and thorough evaluation, which included coronary   angiography as well as echocardiography.  The angiogram failed to demonstrate   any hemodynamically significant lesions.  The echo demonstrated severe mitral   regurgitation.  She now presents for mitral valve repair.    PROCEDURE IN DETAIL:  After obtaining informed and written consent, the patient   was brought to the operating room, placed on the operating table in supine   position.  After induction of adequate general endotracheal anesthesia, the   patient's chest, abdomen, pelvis and bilateral lower extremities were prepped   and draped in usual sterile fashion.  An upper midline skin incision was made   and a median sternotomy was performed.  A pericardial well was created.  The   patient was systemically heparinized.  Cannulation sutures were placed in the   aorta and in the superior vena cava.  The aortic cannula was inserted, followed   by the venous.  An IVC cannula was also placed.   Antegrade and retrograde   cardioplegia catheters were placed.  The patient was then put on cardiopulmonary   bypass.  Once on bypass, circumferential control was obtained over the superior   vena cava.  The aortic cross-clamp was applied, and the heart was arrested   using cold blood-enhanced antegrade cardioplegia.  A prompt electromechanical   arrest was achieved.  Once the cardioplegia was all in, the cannulas were   repositioned.  A left atriotomy was made near the right-sided pulmonary veins.    The Akua retractor was used for exposure.  The mitral valve was evaluated.    There was severe MR with several broken cords along the P2 scallop of the   posterior leaflet.  The annulus was dilated as well.  Multiple nonpledgeted 2-0   Ethibond sutures were placed along the posterior annulus from the medial trigone   to the lateral trigone.  Silk sutures were placed around the nearest normal   cords on either side of the flail segment.  The leaflet height at P3 and P1 was   inadequate for a quadrangular resection and sliding plasty, so we elected to   proceed with a triangular resection.  With silk sutures marking the nearest   normal cords on either side of the flail segment, triangular resection was   performed.  The leaflet was then reconstructed using a running 5-0 Ethibond   suture in 2 layers.  The valve was again tested and no significant mitral   regurgitation was seen.  The annulus was sized, and a 31-mm band was selected.    The band was brought up, the annular sutures were passed through it and it was   slid into position.  It seated nicely.  The sutures were tied and then cut.  The   valve was again tested and no MR was seen.  The atriotomy was then closed in a   single layer using running 4-0 Prolene suture.  Prior to closing the left   atrium, de-airing maneuvers were performed.  Once the left atrium was closed,   the hot shot was given retrograde.  Additional de-airing maneuvers were   performed, and  the aortic cross-clamp was removed.  The patient was subsequently   weaned from cardiopulmonary bypass.  The patient did separate easily from   bypass.  Once off bypass, all surgical sites were inspected.  There was good   hemostasis.  The valve was evaluated using BARBRA.  There was no MR seen.  The mean   gradient across the valve was 2 mmHg.  The test dose of protamine was   administered, and this was well tolerated.  The total dose was then given.    [place] through the total dose, all cannulas were removed.  All surgical sites   were again inspected, and again there was good hemostasis.  Ventricular pacing   wires were placed.  Two drains were placed in the mediastinum.  After again   confirming adequate hemostasis, the patient's chest was closed using eight #6   stainless steel wires to reapproximate the sternum.  The overlying soft tissues   were reapproximated using absorbable suture material.  The patient's chest was   washed and dried, and a dry dressing was applied.  The patient tolerated the   procedure well and there were no complications.  At the conclusion of the case,   sponge and instrument counts were correct.      DIMAS  dd: 09/25/2017 12:57:24 (CDT)  td: 09/25/2017 13:25:52 (CDT)  Doc ID   #[id number]  Job ID #[id number]    CC:

## 2017-09-25 NOTE — PROGRESS NOTES
Pt arrived from OR to SICU [id number]. Pt connected to SICU monitor and vent. VSS and labs drawn. MD notified

## 2017-09-25 NOTE — ANESTHESIA PROCEDURE NOTES
Arterial    Diagnosis: mitral regurgitation  Doctor requesting consult: Yvonne    Patient location during procedure: done in OR  Procedure start time: 9/25/2017 7:20 AM  Procedure end time: 9/25/2017 7:25 AM  Staffing  Anesthesiologist: DANISH MCNEILL  Resident/CRNA: JORDY CARY  Performed: resident/CRNA   Anesthesiologist was present at the time of the procedure.  Preanesthetic Checklist  Completed: patient identified, site marked, surgical consent, pre-op evaluation, timeout performed, IV checked, risks and benefits discussed, monitors and equipment checked and anesthesia consent givenArterial  Skin Prep: chlorhexidine gluconate and isopropyl alcohol  Local Infiltration: none  Orientation: left  Location: radial  Catheter Size: 20 G  Catheter placement by Anatomical landmarks. Heme positive aspiration all ports.Insertion Attempts: 1  Assessment  Dressing: secured with tape and tegaderm  Patient: Tolerated well

## 2017-09-25 NOTE — HPI
Reason for Consult: Management of Hyperglycemia     HPI: Patient is a 52 y.o. female with severe mitral regurgitation. She was symptomatic with dyspnea on exertion. S/p mitral valve repair with stress induced hyperglycemia. Endocrine consulted for bg management.

## 2017-09-25 NOTE — H&P (VIEW-ONLY)
Subjective:      Patient ID: Karolina Jacobs is a 52 y.o. female.    Chief Complaint: Pre-op Exam    HPI:  Karolina Jacobs is a 52 y.o. female who present with complaints of dyspnea on exertion. PMHx of mitral valve insufficiency in 2013, anemia, HTN. She was told for several years prior to that she had mitral valve prolapse, but   no echocardiography was done.  She has developed significant progressive exertional dyspnea with fewer and lighter activities. She has undergone left and right heart catheterization as well as transesophageal echocardiography.      Review of patient's allergies indicates:   Allergen Reactions    Sulfa (sulfonamide antibiotics) Other (See Comments)     Unknown, pt was a child when she had reaction     Past Medical History:   Diagnosis Date    MAGDALENO (dyspnea on exertion) 8/22/2017    Essential hypertension 8/22/2017    Iron deficiency anemia due to chronic blood loss 8/22/2017    Non-rheumatic mitral regurgitation 8/22/2017     No past surgical history on file.  Family History     None        Social History     Social History    Marital status:      Spouse name: N/A    Number of children: N/A    Years of education: N/A     Occupational History    Not on file.     Social History Main Topics    Smoking status: Never Smoker    Smokeless tobacco: Never Used    Alcohol use No    Drug use: Unknown    Sexual activity: Not on file     Other Topics Concern    Not on file     Social History Narrative    No narrative on file       Current medications Reviewed    Review of Systems   Constitutional: Positive for activity change and fatigue. Negative for appetite change and fever.   HENT: Negative for congestion, dental problem, sneezing and sore throat.    Eyes: Negative for pain, discharge and visual disturbance.   Respiratory: Positive for shortness of breath. Negative for cough and wheezing.    Cardiovascular: Positive for palpitations. Negative for chest pain and leg swelling.    Gastrointestinal: Negative for abdominal distention, abdominal pain, constipation, diarrhea, nausea and vomiting.   Endocrine: Negative for polydipsia, polyphagia and polyuria.   Genitourinary: Negative for dysuria, frequency and urgency.   Musculoskeletal: Negative for back pain and gait problem.   Skin: Negative for rash and wound.   Neurological: Negative for dizziness, seizures, syncope and headaches.   Hematological: Does not bruise/bleed easily.   Psychiatric/Behavioral: Negative for agitation and confusion. The patient is not nervous/anxious.      Objective:   Physical Exam   Constitutional: She is oriented to person, place, and time. She appears well-developed and well-nourished.   HENT:   Head: Normocephalic and atraumatic.   Eyes: Pupils are equal, round, and reactive to light.   Neck: Normal range of motion. Neck supple.   Cardiovascular: Normal rate and regular rhythm.    Murmur heard.  Pulmonary/Chest: Effort normal and breath sounds normal.   Abdominal: Soft. Bowel sounds are normal.   Musculoskeletal: Normal range of motion.   Neurological: She is alert and oriented to person, place, and time.   Skin: Skin is warm and dry.   Psychiatric: She has a normal mood and affect. Her behavior is normal.     Assessment:   1. Mitral Valve prolapse   2. Mitral valve regurg   3. Iron deficiency anemia  4. Heavy menstrual bleeding   5. HTN  Plan:       - To OR for MVR on 9/25/17  - Consents signed and in chart    Rosales Ruiz M.D.  General Surgery PGY2  [phone]        CTS Attending Note:    I have personally taken the history and examined this patient and agree with the resident's note as stated above. Questions answered. She desires a mechanical valve if repair is not feasible.

## 2017-09-25 NOTE — CONSULTS
[facility]  Endocrinology  Diabetes Consult Note    Consult Requested by: Philip Russell MD   Reason for admit: Mitral regurgitation    HISTORY OF PRESENT ILLNESS:  Reason for Consult: Management of Hyperglycemia     Surgical Procedure and Date: S/P Mitral valve repair 9/25/17    Diabetes diagnosis year: N/A    Home Diabetes Medications:  None    HPI:   Patient is a 52 y.o. female with a diagnosis of severe mitral regurgitation. She was symptomatic with dyspnea on exertion. S/p mitral valve repair. Endocrine consulted for management of post-op hyperglycemia.          Interval HPI:   Intubated. BGs 206-153-159 since arrival to ICU. Hasn't started insulin infusion yet. No hypoglycemia. Currently afebrile.     PMH, PSH, FH, SH updated and reviewed     Review of Systems  Unable to obtain due to: Intubation, but reviewed ROS from note dated 9/21/17 by Dr. Russell    Current Medications and/or Treatments Impacting Glycemic Control  Immunotherapy:    Immunosuppressants     None        Steroids:   Hormones     None        Pressors:    Autonomic Drugs     Start     Stop Route Frequency Ordered    09/25/17 1330  epinephrine 4 mg in sodium chloride 0.9% 250 mL infusion     Question Answer Comment   Titrate by: (in mcg/kg/min) 0.02    Titrate interval: (in minutes) 5    Titrate to maintain: (SBP or MAP or Cardiac Index) MAP    Greater than: (in mmHg) 65    Cardiac index greater than: (in L/min) 2.2    Maximum dose: (in mcg/kg/min) 2        -- IV Continuous 09/25/17 1326    09/25/17 1325  metoprolol tartrate (LOPRESSOR) split tablet 12.5 mg      -- Oral Every 12 hours 09/25/17 1325        Hyperglycemia/Diabetes Medications:   Antihyperglycemics     Start     Stop Route Frequency Ordered    09/25/17 1330  insulin regular (Humulin R) 100 Units in sodium chloride 0.9% 100 mL infusion      -- IV Continuous 09/25/17 1326          PHYSICAL EXAMINATION:Vitals:    09/25/17 1400   BP: (!) 107/52   Pulse: 90    Resp: 10   Temp:      Body mass index is 32.28 kg/m².    Physical Exam   Constitutional:  Well developed, well nourished, NAD.  ENT: External ears no masses with nose patent; normal hearing.   Neck: trachea midline; no thyromegaly.   Cardiovascular: Normal heart sounds, no LE edema.     Lungs:  intubated; lungs anterior bilaterally clear to auscultation.  Abdomen:  Soft, no masses,  no hernias.  MS: No clubbing or cyanosis of nails noted; unable to assess gait.  Skin: No rashes, lesions, or ulcers; no nodules.        Labs Reviewed and Include   No results for input(s): GLU, CALCIUM, ALBUMIN, PROT, NA, K, CO2, CL, BUN, CREATININE, ALKPHOS, ALT, AST, BILITOT in the last 24 hours.  Lab Results   Component Value Date    WBC 7.07 09/21/2017    HGB 9.5 (L) 09/21/2017    HCT 24 (L) 09/25/2017    MCV 67 (L) 09/21/2017     09/21/2017     No results for input(s): TSH, FREET4 in the last 168 hours.  Lab Results   Component Value Date    HGBA1C 5.3 09/21/2017       Nutritional status:   Body mass index is 32.28 kg/m².  Lab Results   Component Value Date    ALBUMIN 3.6 09/21/2017     No results found for: PREALBUMIN    Estimated Creatinine Clearance: 84.9 mL/min (based on SCr of 0.9 mg/dL).    Accu-Checks  No results for input(s): POCTGLUCOSE in the last 72 hours.     ASSESSMENT and PLAN    * Mitral regurgitation    S/P mitral valve repair  Per CTS          Hyperglycemia    BG goal 110-140 for the first 3 days postop. Continue CTS protocol, hourly BG monitoring.     Discharge Planning: Anticipate resolution          S/P mitral valve repair    Per CTS              Vika Leon, EDI, NP  Endocrinology  [facility]

## 2017-09-25 NOTE — INTERVAL H&P NOTE
The patient has been examined and the H&P has been reviewed:    I concur with the findings and no changes have occurred since H&P was written.    Anesthesia/Surgery risks, benefits and alternative options discussed and understood by patient/family.          Active Hospital Problems    Diagnosis  POA    Mitral regurgitation [I34.0]  Yes      Resolved Hospital Problems    Diagnosis Date Resolved POA   No resolved problems to display.

## 2017-09-26 PROBLEM — I34.0 MITRAL REGURGITATION: Status: RESOLVED | Noted: 2017-09-25 | Resolved: 2017-09-26

## 2017-09-26 LAB
ANION GAP SERPL CALC-SCNC: 9 MMOL/L
ANISOCYTOSIS BLD QL SMEAR: SLIGHT
APTT BLDCRRT: 22.8 SEC
BASOPHILS # BLD AUTO: 0.01 K/UL
BASOPHILS NFR BLD: 0.1 %
BUN SERPL-MCNC: 7 MG/DL
CALCIUM SERPL-MCNC: 8.3 MG/DL
CHLORIDE SERPL-SCNC: 104 MMOL/L
CO2 SERPL-SCNC: 24 MMOL/L
CREAT SERPL-MCNC: 0.7 MG/DL
DIFFERENTIAL METHOD: ABNORMAL
EOSINOPHIL # BLD AUTO: 0 K/UL
EOSINOPHIL NFR BLD: 0 %
ERYTHROCYTE [DISTWIDTH] IN BLOOD BY AUTOMATED COUNT: 15.4 %
EST. GFR  (AFRICAN AMERICAN): >60 ML/MIN/1.73 M^2
EST. GFR  (NON AFRICAN AMERICAN): >60 ML/MIN/1.73 M^2
GIANT PLATELETS BLD QL SMEAR: PRESENT
GLUCOSE SERPL-MCNC: 135 MG/DL (ref 70–110)
GLUCOSE SERPL-MCNC: 142 MG/DL
GLUCOSE SERPL-MCNC: 157 MG/DL (ref 70–110)
GLUCOSE SERPL-MCNC: 207 MG/DL (ref 70–110)
HCO3 UR-SCNC: 22.1 MMOL/L (ref 24–28)
HCO3 UR-SCNC: 22.7 MMOL/L (ref 24–28)
HCO3 UR-SCNC: 24.8 MMOL/L (ref 24–28)
HCT VFR BLD AUTO: 29 %
HCT VFR BLD CALC: 18 %PCV (ref 36–54)
HCT VFR BLD CALC: 25 %PCV (ref 36–54)
HCT VFR BLD CALC: 26 %PCV (ref 36–54)
HGB BLD-MCNC: 8.8 G/DL
HYPOCHROMIA BLD QL SMEAR: ABNORMAL
INR PPP: 1
LYMPHOCYTES # BLD AUTO: 0.9 K/UL
LYMPHOCYTES NFR BLD: 5.2 %
MAGNESIUM SERPL-MCNC: 2 MG/DL
MCH RBC QN AUTO: 20.3 PG
MCHC RBC AUTO-ENTMCNC: 30.3 G/DL
MCV RBC AUTO: 67 FL
MONOCYTES # BLD AUTO: 1 K/UL
MONOCYTES NFR BLD: 6.1 %
NEUTROPHILS # BLD AUTO: 14.8 K/UL
NEUTROPHILS NFR BLD: 88.6 %
OVALOCYTES BLD QL SMEAR: ABNORMAL
PCO2 BLDA: 35.6 MMHG (ref 35–45)
PCO2 BLDA: 37.3 MMHG (ref 35–45)
PCO2 BLDA: 39.7 MMHG (ref 35–45)
PH SMN: 7.37 [PH] (ref 7.35–7.45)
PH SMN: 7.38 [PH] (ref 7.35–7.45)
PH SMN: 7.45 [PH] (ref 7.35–7.45)
PHOSPHATE SERPL-MCNC: 3 MG/DL
PLATELET # BLD AUTO: 205 K/UL
PLATELET BLD QL SMEAR: ABNORMAL
PMV BLD AUTO: 11 FL
PO2 BLDA: 127 MMHG (ref 80–100)
PO2 BLDA: 154 MMHG (ref 80–100)
PO2 BLDA: 192 MMHG (ref 80–100)
POC BE: -3 MMOL/L
POC BE: -3 MMOL/L
POC BE: 1 MMOL/L
POC IONIZED CALCIUM: 0.99 MMOL/L (ref 1.06–1.42)
POC IONIZED CALCIUM: 1.11 MMOL/L (ref 1.06–1.42)
POC IONIZED CALCIUM: 1.12 MMOL/L (ref 1.06–1.42)
POC SATURATED O2: 100 % (ref 95–100)
POC SATURATED O2: 99 % (ref 95–100)
POC SATURATED O2: 99 % (ref 95–100)
POC TCO2: 23 MMOL/L (ref 23–27)
POC TCO2: 24 MMOL/L (ref 23–27)
POC TCO2: 26 MMOL/L (ref 23–27)
POCT GLUCOSE: 127 MG/DL (ref 70–110)
POCT GLUCOSE: 131 MG/DL (ref 70–110)
POCT GLUCOSE: 131 MG/DL (ref 70–110)
POCT GLUCOSE: 146 MG/DL (ref 70–110)
POCT GLUCOSE: 146 MG/DL (ref 70–110)
POCT GLUCOSE: 153 MG/DL (ref 70–110)
POCT GLUCOSE: 154 MG/DL (ref 70–110)
POCT GLUCOSE: 156 MG/DL (ref 70–110)
POCT GLUCOSE: 159 MG/DL (ref 70–110)
POCT GLUCOSE: 165 MG/DL (ref 70–110)
POCT GLUCOSE: 167 MG/DL (ref 70–110)
POCT GLUCOSE: 170 MG/DL (ref 70–110)
POCT GLUCOSE: 175 MG/DL (ref 70–110)
POCT GLUCOSE: 175 MG/DL (ref 70–110)
POCT GLUCOSE: 177 MG/DL (ref 70–110)
POCT GLUCOSE: 178 MG/DL (ref 70–110)
POCT GLUCOSE: 184 MG/DL (ref 70–110)
POCT GLUCOSE: 191 MG/DL (ref 70–110)
POCT GLUCOSE: 96 MG/DL (ref 70–110)
POIKILOCYTOSIS BLD QL SMEAR: SLIGHT
POLYCHROMASIA BLD QL SMEAR: ABNORMAL
POTASSIUM BLD-SCNC: 3.8 MMOL/L (ref 3.5–5.1)
POTASSIUM BLD-SCNC: 4 MMOL/L (ref 3.5–5.1)
POTASSIUM BLD-SCNC: 4.4 MMOL/L (ref 3.5–5.1)
POTASSIUM SERPL-SCNC: 4.1 MMOL/L
POTASSIUM SERPL-SCNC: 4.2 MMOL/L
PROTHROMBIN TIME: 11.1 SEC
RBC # BLD AUTO: 4.34 M/UL
SAMPLE: ABNORMAL
SCHISTOCYTES BLD QL SMEAR: ABNORMAL
SODIUM BLD-SCNC: 139 MMOL/L (ref 136–145)
SODIUM BLD-SCNC: 140 MMOL/L (ref 136–145)
SODIUM BLD-SCNC: 141 MMOL/L (ref 136–145)
SODIUM SERPL-SCNC: 137 MMOL/L
SPHEROCYTES BLD QL SMEAR: ABNORMAL
WBC # BLD AUTO: 16.68 K/UL

## 2017-09-26 PROCEDURE — 97161 PT EVAL LOW COMPLEX 20 MIN: CPT

## 2017-09-26 PROCEDURE — 25000003 PHARM REV CODE 250: Performed by: STUDENT IN AN ORGANIZED HEALTH CARE EDUCATION/TRAINING PROGRAM

## 2017-09-26 PROCEDURE — 20600001 HC STEP DOWN PRIVATE ROOM

## 2017-09-26 PROCEDURE — 84132 ASSAY OF SERUM POTASSIUM: CPT

## 2017-09-26 PROCEDURE — 63600175 PHARM REV CODE 636 W HCPCS: Performed by: STUDENT IN AN ORGANIZED HEALTH CARE EDUCATION/TRAINING PROGRAM

## 2017-09-26 PROCEDURE — 97166 OT EVAL MOD COMPLEX 45 MIN: CPT

## 2017-09-26 PROCEDURE — 80048 BASIC METABOLIC PNL TOTAL CA: CPT

## 2017-09-26 PROCEDURE — C9113 INJ PANTOPRAZOLE SODIUM, VIA: HCPCS | Performed by: STUDENT IN AN ORGANIZED HEALTH CARE EDUCATION/TRAINING PROGRAM

## 2017-09-26 PROCEDURE — 83735 ASSAY OF MAGNESIUM: CPT

## 2017-09-26 PROCEDURE — 99233 SBSQ HOSP IP/OBS HIGH 50: CPT | Mod: ,,, | Performed by: SURGERY

## 2017-09-26 PROCEDURE — 85025 COMPLETE CBC W/AUTO DIFF WBC: CPT

## 2017-09-26 PROCEDURE — 25000003 PHARM REV CODE 250: Performed by: NURSE PRACTITIONER

## 2017-09-26 PROCEDURE — 85730 THROMBOPLASTIN TIME PARTIAL: CPT

## 2017-09-26 PROCEDURE — 63600175 PHARM REV CODE 636 W HCPCS: Performed by: NURSE PRACTITIONER

## 2017-09-26 PROCEDURE — 25000242 PHARM REV CODE 250 ALT 637 W/ HCPCS: Performed by: STUDENT IN AN ORGANIZED HEALTH CARE EDUCATION/TRAINING PROGRAM

## 2017-09-26 PROCEDURE — A4216 STERILE WATER/SALINE, 10 ML: HCPCS | Performed by: STUDENT IN AN ORGANIZED HEALTH CARE EDUCATION/TRAINING PROGRAM

## 2017-09-26 PROCEDURE — 99233 SBSQ HOSP IP/OBS HIGH 50: CPT | Mod: ,,, | Performed by: NURSE PRACTITIONER

## 2017-09-26 PROCEDURE — 85610 PROTHROMBIN TIME: CPT

## 2017-09-26 PROCEDURE — 97802 MEDICAL NUTRITION INDIV IN: CPT

## 2017-09-26 PROCEDURE — 84100 ASSAY OF PHOSPHORUS: CPT

## 2017-09-26 PROCEDURE — 94640 AIRWAY INHALATION TREATMENT: CPT

## 2017-09-26 RX ORDER — HYDRALAZINE HYDROCHLORIDE 20 MG/ML
10 INJECTION INTRAMUSCULAR; INTRAVENOUS EVERY 6 HOURS PRN
Status: DISCONTINUED | OUTPATIENT
Start: 2017-09-26 | End: 2017-09-28

## 2017-09-26 RX ORDER — GLUCAGON 1 MG
1 KIT INJECTION
Status: DISCONTINUED | OUTPATIENT
Start: 2017-09-26 | End: 2017-09-27

## 2017-09-26 RX ORDER — IBUPROFEN 200 MG
16 TABLET ORAL
Status: DISCONTINUED | OUTPATIENT
Start: 2017-09-26 | End: 2017-09-27

## 2017-09-26 RX ORDER — INSULIN ASPART 100 [IU]/ML
0-4 INJECTION, SOLUTION INTRAVENOUS; SUBCUTANEOUS
Status: DISCONTINUED | OUTPATIENT
Start: 2017-09-26 | End: 2017-09-27

## 2017-09-26 RX ORDER — IBUPROFEN 200 MG
24 TABLET ORAL
Status: DISCONTINUED | OUTPATIENT
Start: 2017-09-26 | End: 2017-09-27

## 2017-09-26 RX ADMIN — CEFAZOLIN SODIUM 2 G: 2 SOLUTION INTRAVENOUS at 12:09

## 2017-09-26 RX ADMIN — DOCUSATE SODIUM 100 MG: 100 CAPSULE, LIQUID FILLED ORAL at 08:09

## 2017-09-26 RX ADMIN — IPRATROPIUM BROMIDE AND ALBUTEROL SULFATE 3 ML: .5; 3 SOLUTION RESPIRATORY (INHALATION) at 11:09

## 2017-09-26 RX ADMIN — OXYCODONE HYDROCHLORIDE 10 MG: 5 TABLET ORAL at 07:09

## 2017-09-26 RX ADMIN — Medication 3 ML: at 08:09

## 2017-09-26 RX ADMIN — IPRATROPIUM BROMIDE AND ALBUTEROL SULFATE 3 ML: .5; 3 SOLUTION RESPIRATORY (INHALATION) at 08:09

## 2017-09-26 RX ADMIN — POTASSIUM CHLORIDE 20 MEQ: 200 INJECTION, SOLUTION INTRAVENOUS at 12:09

## 2017-09-26 RX ADMIN — NICARDIPINE HYDROCHLORIDE 5 MG/HR: 0.2 INJECTION, SOLUTION INTRAVENOUS at 06:09

## 2017-09-26 RX ADMIN — METOPROLOL TARTRATE 12.5 MG: 25 TABLET, FILM COATED ORAL at 08:09

## 2017-09-26 RX ADMIN — FUROSEMIDE 20 MG: 10 INJECTION, SOLUTION INTRAMUSCULAR; INTRAVENOUS at 08:09

## 2017-09-26 RX ADMIN — CEFAZOLIN SODIUM 2 G: 2 SOLUTION INTRAVENOUS at 04:09

## 2017-09-26 RX ADMIN — IPRATROPIUM BROMIDE AND ALBUTEROL SULFATE 3 ML: .5; 3 SOLUTION RESPIRATORY (INHALATION) at 03:09

## 2017-09-26 RX ADMIN — Medication 3 ML: at 02:09

## 2017-09-26 RX ADMIN — OXYCODONE HYDROCHLORIDE 10 MG: 5 TABLET ORAL at 04:09

## 2017-09-26 RX ADMIN — ATORVASTATIN CALCIUM 40 MG: 20 TABLET, FILM COATED ORAL at 08:09

## 2017-09-26 RX ADMIN — POTASSIUM CHLORIDE 20 MEQ: 1500 TABLET, EXTENDED RELEASE ORAL at 08:09

## 2017-09-26 RX ADMIN — FUROSEMIDE 20 MG: 10 INJECTION, SOLUTION INTRAMUSCULAR; INTRAVENOUS at 05:09

## 2017-09-26 RX ADMIN — MUPIROCIN 1 G: 20 OINTMENT TOPICAL at 08:09

## 2017-09-26 RX ADMIN — POLYETHYLENE GLYCOL 3350 17 G: 17 POWDER, FOR SOLUTION ORAL at 08:09

## 2017-09-26 RX ADMIN — OXYCODONE HYDROCHLORIDE 10 MG: 5 TABLET ORAL at 01:09

## 2017-09-26 RX ADMIN — METOCLOPRAMIDE 5 MG: 5 INJECTION, SOLUTION INTRAMUSCULAR; INTRAVENOUS at 04:09

## 2017-09-26 RX ADMIN — PANTOPRAZOLE SODIUM 40 MG: 40 INJECTION, POWDER, FOR SOLUTION INTRAVENOUS at 09:09

## 2017-09-26 RX ADMIN — MUPIROCIN 1 G: 20 OINTMENT TOPICAL at 09:09

## 2017-09-26 RX ADMIN — SODIUM CHLORIDE 1.5 UNITS/HR: 9 INJECTION, SOLUTION INTRAVENOUS at 05:09

## 2017-09-26 RX ADMIN — OXYCODONE HYDROCHLORIDE 10 MG: 5 TABLET ORAL at 06:09

## 2017-09-26 RX ADMIN — OXYCODONE HYDROCHLORIDE 10 MG: 5 TABLET ORAL at 10:09

## 2017-09-26 RX ADMIN — CEFAZOLIN SODIUM 2 G: 2 SOLUTION INTRAVENOUS at 08:09

## 2017-09-26 RX ADMIN — ASPIRIN 325 MG ORAL TABLET 325 MG: 325 PILL ORAL at 08:09

## 2017-09-26 RX ADMIN — Medication 3 ML: at 05:09

## 2017-09-26 NOTE — CONSULTS
Thanks for the consult. Patient was seen today, note timed 12:18 PM. She's very interested in going to cardiac rehab.

## 2017-09-26 NOTE — PLAN OF CARE
Problem: Occupational Therapy Goal  Goal: Occupational Therapy Goal  Goals to be met by: 10/06/17     Patient will increase functional independence with ADLs by performing:    Feeding with [place].  UE Dressing with Supervision.  LE Dressing with Supervision.  Grooming while standing at sink with Supervision.  Toileting from toilet with Supervision for hygiene and clothing management.   Toilet transfer to toilet with Supervision.    Outcome: Ongoing (interventions implemented as appropriate)  OT eval completed, and above goals established. GASTON Patricio  9/26/2017

## 2017-09-26 NOTE — SUBJECTIVE & OBJECTIVE
"Interval HPI:   Overnight events: BG at goal, extubated. Insulin rates ranging from 2.7 - 4.7 units/hr  Eating:   SF clears  Nausea: No  Hypoglycemia and intervention: No  Fever: No  TPN and/or TF: No  If yes, type of TF/TPN and rate:     BP (!) 108/56 (BP Location: Right arm, Patient Position: Lying)   Pulse 105   Temp 98.9 °F (37.2 °C) (Oral)   Resp 10   Ht 5' 6" (1.676 m)   Wt 90.9 kg (200 lb 6.4 oz)   LMP 09/18/2017   SpO2 95%   Breastfeeding? No   BMI 32.35 kg/m²     Labs Reviewed and Include      Recent Labs  Lab 09/26/17  0314   *   CALCIUM 8.3*      K 4.1   CO2 24      BUN 7   CREATININE 0.7     Lab Results   Component Value Date    WBC 16.68 (H) 09/26/2017    HGB 8.8 (L) 09/26/2017    HCT 29.0 (L) 09/26/2017    MCV 67 (L) 09/26/2017     09/26/2017     No results for input(s): TSH, FREET4 in the last 168 hours.  Lab Results   Component Value Date    HGBA1C 5.3 09/21/2017       Nutritional status:   Body mass index is 32.35 kg/m².  Lab Results   Component Value Date    ALBUMIN 3.6 09/21/2017     No results found for: PREALBUMIN    Estimated Creatinine Clearance: 109.1 mL/min (based on SCr of 0.7 mg/dL).    Accu-Checks  Recent Labs      09/25/17   1953  09/25/17   2054  09/25/17   2154  09/25/17   2305  09/25/17   2351  09/26/17   0112  09/26/17   0207  09/26/17   0312  09/26/17   0402  09/26/17   0459   POCTGLUCOSE  175*  167*  177*  156*  170*  146*  154*  146*  131*  165*       Current Medications and/or Treatments Impacting Glycemic Control  Immunotherapy:  Immunosuppressants     None        Steroids:   Hormones     None        Pressors:    Autonomic Drugs     Start     Stop Route Frequency Ordered    09/25/17 1330  epinephrine 4 mg in sodium chloride 0.9% 250 mL infusion     Question Answer Comment   Titrate by: (in mcg/kg/min) 0.02    Titrate interval: (in minutes) 5    Titrate to maintain: (SBP or MAP or Cardiac Index) MAP    Greater than: (in mmHg) 65    Cardiac index " greater than: (in L/min) 2.2    Maximum dose: (in mcg/kg/min) 2        -- IV Continuous 09/25/17 1326    09/25/17 1325  metoprolol tartrate (LOPRESSOR) split tablet 12.5 mg      -- Oral Every 12 hours 09/25/17 1325        Hyperglycemia/Diabetes Medications: Antihyperglycemics     Start     Stop Route Frequency Ordered    09/25/17 1330  insulin regular (Humulin R) 100 Units in sodium chloride 0.9% 100 mL infusion      -- IV Continuous 09/25/17 1326

## 2017-09-26 NOTE — PLAN OF CARE
Problem: Physical Therapy Goal  Goal: Physical Therapy Goal  Goals to be met by: 10/3/17    Patient will increase functional independence with mobility by performin. Supine to sit with [place] - not met  2. Sit to stand transfer with [place] -not met  3. Gait  x 220 feet with [place] - not met  eval completed and goals appropriate.  Helen Maynard, PT 2017

## 2017-09-26 NOTE — PROGRESS NOTES
"  [facility]  Adult Nutrition  Consult Note    SUMMARY     Recommendations    Recommendation/Intervention:   1. Encourage adequate meal intake daily   2. If meal consumption is consistently under 50%, include Boost Plus TID  Goals: Pt will meet at least 75% of nutritional needs PO  Nutrition Goal Status: progressing towards goal  Communication of RD Recs: reviewed with RN    Reason for Assessment    Reason for Assessment: physician consult  Diagnosis:  (Post-op mitral valve repair)  Relevent Medical History: high blood pressure, iron deficiency anemia due to chronic blood loss, non-rheumatic mitral regurgitation   Interdisciplinary Rounds: did not attend     General Information Comments: RD unable to assess, pt was stepped down     Nutrition Discharge Planning: Pt meeting at least 75% of their nutritional needs    Nutrition Prescription Ordered    Current Diet Order: cardiac   Nutrition Order Comments: 1500 fluid restriction    Evaluation of Received Nutrients/Fluid Intake      Intake/Output Summary (Last 24 hours) at 09/26/17 1432  Last data filed at 09/26/17 1300   Gross per 24 hour   Intake             2154 ml   Output             3621 ml   Net            -1467 ml       I/O: reviewed      Comments: LBM not cited in Epic    Nutrition Risk Screen     Nutrition Risk Screen: no indicators present    Nutrition/Diet History    Patient Reported Diet/Restrictions/Preferences:  (ALYSSA)    Labs/Tests/Procedures/Meds    Pertinent Labs Reviewed: reviewed  Pertinent Labs Comments: WBC 16.7, H/H 8.8/29.0, Glucose 142, Ca 8.3, A1C 5.3, Alb 3.6  Pertinent Medications Reviewed: reviewed  Pertinent Medications Comments: insulin drip, atorvastatin, docusate sodium, furosemide, k phos di & mono-sod phos mono, metoclopramide, pantoprazole    Physical Findings    Tubes:  (central line, chest tube)     Skin: incision    Anthropometrics    Temp: 98.6 °F (37 °C)     Height: 5' 6" (167.6 cm)  Weight Method: Bed " Scale  Weight: 90.9 kg (200 lb 6.4 oz)     Ideal Body Weight (IBW), Female: 130 lb     % Ideal Body Weight, Female (lb): 153.85 lb  BMI (Calculated): 32.3    Estimated/Assessed Needs    Weight Used For Calorie Calculations: 90.9 kg (200 lb 6.4 oz)   Height (cm): 167.6 cm  Energy Calorie Requirements (kcal): 1931  Energy Need Method: [place]-St Jeor (x 1.25)        RMR ([place]-St. Jeor Equation): 1545.75        Weight Used For Protein Calculations: 90.9 kg (200 lb 6.4 oz)  Protein Requirements:  g (1.0-1.2)       Fluid Need Method: RDA Method (1 ml/kg or per MD)        RDA Method (mL): 1931    Assessment and Plan    No nutrition diagnosis at this time    Monitor and Evaluation    Food and Nutrient Intake: energy intake, food and beverage intake  Food and Nutrient Adminstration: diet order     Physical Activity and Function: nutrition-related ADLs and IADLs  Anthropometric Measurements: weight, weight change  Biochemical Data, Medical Tests and Procedures:  (All labs)  Nutrition-Focused Physical Findings: overall appearance    Nutrition Risk    Level of Risk:  (RD f/u 1x wk)    Nutrition Follow-Up    RD Follow-up?: Yes

## 2017-09-26 NOTE — PROGRESS NOTES
"[facility]  Endocrinology  Progress Note    Admit Date: 9/25/2017     Reason for Consult: Management of Hyperglycemia     Surgical Procedure and Date: S/P Mitral valve repair 9/25/17    Diabetes diagnosis year: N/A    Home Diabetes Medications:  None    Hemoglobin A1C   Date Value Ref Range Status   09/21/2017 5.3 4.0 - 5.6 % Final       HPI:   Patient is a 52 y.o. female with a diagnosis of severe mitral regurgitation. She was symptomatic with dyspnea on exertion. S/p mitral valve repair. Endocrine consulted for management of post-op hyperglycemia.          Interval HPI:   Overnight events: BG at goal, extubated. Insulin rates ranging from 2.7 - 4.7 units/hr  Eating:   SF clears  Nausea: No  Hypoglycemia and intervention: No  Fever: No  TPN and/or TF: No  If yes, type of TF/TPN and rate:     BP (!) 108/56 (BP Location: Right arm, Patient Position: Lying)   Pulse 105   Temp 98.9 °F (37.2 °C) (Oral)   Resp 10   Ht 5' 6" (1.676 m)   Wt 90.9 kg (200 lb 6.4 oz)   LMP 09/18/2017   SpO2 95%   Breastfeeding? No   BMI 32.35 kg/m²       Labs Reviewed and Include      Recent Labs  Lab 09/26/17  0314   *   CALCIUM 8.3*      K 4.1   CO2 24      BUN 7   CREATININE 0.7     Lab Results   Component Value Date    WBC 16.68 (H) 09/26/2017    HGB 8.8 (L) 09/26/2017    HCT 29.0 (L) 09/26/2017    MCV 67 (L) 09/26/2017     09/26/2017     No results for input(s): TSH, FREET4 in the last 168 hours.  Lab Results   Component Value Date    HGBA1C 5.3 09/21/2017       Nutritional status:   Body mass index is 32.35 kg/m².  Lab Results   Component Value Date    ALBUMIN 3.6 09/21/2017     No results found for: PREALBUMIN    Estimated Creatinine Clearance: 109.1 mL/min (based on SCr of 0.7 mg/dL).    Accu-Checks  Recent Labs      09/25/17   1953  09/25/17   2054  09/25/17   2154  09/25/17   2305  09/25/17   2351  09/26/17   0112  09/26/17   0207  09/26/17   0312  09/26/17   0402  09/26/17   " 0459   POCTGLUCOSE  175*  167*  177*  156*  170*  146*  154*  146*  131*  165*       Current Medications and/or Treatments Impacting Glycemic Control  Immunotherapy:  Immunosuppressants     None        Steroids:   Hormones     None        Pressors:    Autonomic Drugs     Start     Stop Route Frequency Ordered    09/25/17 1330  epinephrine 4 mg in sodium chloride 0.9% 250 mL infusion     Question Answer Comment   Titrate by: (in mcg/kg/min) 0.02    Titrate interval: (in minutes) 5    Titrate to maintain: (SBP or MAP or Cardiac Index) MAP    Greater than: (in mmHg) 65    Cardiac index greater than: (in L/min) 2.2    Maximum dose: (in mcg/kg/min) 2        -- IV Continuous 09/25/17 1326    09/25/17 1325  metoprolol tartrate (LOPRESSOR) split tablet 12.5 mg      -- Oral Every 12 hours 09/25/17 1325        Hyperglycemia/Diabetes Medications: Antihyperglycemics     Start     Stop Route Frequency Ordered    09/25/17 1330  insulin regular (Humulin R) 100 Units in sodium chloride 0.9% 100 mL infusion      -- IV Continuous 09/25/17 1326          ASSESSMENT and PLAN    Hyperglycemia    BG goal 110-140 for the first 3 days postop. Continue CTS protocol, change bG to q2h. Can change to transition insulin infusion once insulin needs decrease.      Discharge Planning: Anticipate resolution          S/P mitral valve repair    Per CTS          Non-rheumatic mitral regurgitation    S/p MVR              Yas Beltran NP  Endocrinology  [facility]      ADDENDUM 1430: BG remains at upper end of goal () on 4.7 units/hr of insulin, cannot change to transition insulin infusion with high insulin needs, as anticipate decline in insulin needs in next 24 hours. Will trial decreasing gtt to 2.5 units/hr, BG in 2 hours, bedside RN to call with BG reading     ADDENDUM 1630: BG improving despite decrease by almost 50% of insulin infusion rate, will change to transition insulin infusion @ 1.5 units/hr, BG ac/hs/0200 with  low dose correction scale.

## 2017-09-26 NOTE — PROGRESS NOTES
Progress Note  Surgical Intensive Care    Admit Date: 2017  Post-operative Day: 1 Day Post-Op  Hospital Day: 2    SUBJECTIVE:     Follow-up For:  Procedure(s) (LRB):  REPAIR VALVE-MITRAL (N/A)    Interval history:  NAEON.  Extubated shortly after arriving from OR.  Some shoulder and neck cramping overnight.  Making urine.      Continuous Infusions:   sodium chloride 0.9% Stopped (17 0826)    dextrose 5 % and 0.45 % NaCl with KCl 20 mEq 5 mL/hr at 17 0800    epinephrine Stopped (17 1330)    insulin (HUMAN R) infusion (adults) 4.7 Units/hr (17 0800)    propofol       Scheduled Meds:   albuterol-ipratropium 2.5mg-0.5mg/3mL  3 mL Nebulization Q4H    aspirin  325 mg Oral Daily    atorvastatin  40 mg Oral QHS    ceFAZolin (ANCEF) IVPB  2 g Intravenous Q8H    docusate sodium  100 mg Oral BID    furosemide  20 mg Intravenous BID    metoprolol tartrate  12.5 mg Oral Q12H    mupirocin  1 g Nasal BID    pantoprazole  40 mg Intravenous Daily    polyethylene glycol  17 g Oral Daily    potassium chloride  20 mEq Oral Q12H    sodium chloride 0.9%  3 mL Intravenous Q8H     PRN Meds:acetaminophen, albuterol-ipratropium 2.5mg-0.5mg/3mL, bisacodyl, dextrose 50%, dextrose 50%, fentaNYL, [] fentaNYL **FOLLOWED BY** fentaNYL, hydrALAZINE, magnesium sulfate IVPB, magnesium sulfate IVPB, metoclopramide HCl, ondansetron, oxycodone, oxycodone, potassium chloride **AND** potassium chloride **AND** potassium chloride, sodium phosphate IVPB, sodium phosphate IVPB, sodium phosphate IVPB    Review of patient's allergies indicates:   Allergen Reactions    Sulfa (sulfonamide antibiotics) Other (See Comments)     Unknown, pt was a child when she had reaction       OBJECTIVE:     Vital Signs (Most Recent)  Temp: 98.9 °F (37.2 °C) (17 0715)  Pulse: 95 (17 0834)  Resp: 16 (17)  BP: (!) 108/56 (17 0715)  SpO2: 96 % (17 0834)    Vital Signs Range (Last 24H):  Temp:   [97.6 °F (36.4 °C)-98.9 °F (37.2 °C)]   Pulse:  []   Resp:  [3-21]   BP: (100-110)/(52-67)   SpO2:  [95 %-100 %]   Arterial Line BP: ()/(54-75)     I & O (Last 24H):  Intake/Output Summary (Last 24 hours) at 09/26/17 0911  Last data filed at 09/26/17 0800   Gross per 24 hour   Intake             3568 ml   Output             4951 ml   Net            -1383 ml     Ventilator Data (Last 24H):     Vent Mode: Spont  Oxygen Concentration (%):  [] 40  Resp Rate Total:  [13 br/min-21 br/min] 17 br/min  Vt Set:  [450 mL] 450 mL  PEEP/CPAP:  [5 cmH20] 5 cmH20  Pressure Support:  [0 loL25-56 cmH20] 12 cmH20  Mean Airway Pressure:  [8 cmH20-10 cmH20] 8 cmH20    Hemodynamic Parameters (Last 24H):       Physical Exam:  General: no distress  Lungs:  clear to auscultation bilaterally and normal respiratory effort  Chest Wall: CTs in place  Heart: regular rate and rhythm  Abdomen: soft, non-tender non-distented; bowel sounds normal; no masses,  no organomegaly  Extremities: 2+ pulses bilaterally     Wound/Incision:  clean, dry, intact    Lines/Drains:       Percutaneous Central Line Insertion/Assessment - quad lumen  09/25/17 0742 right internal jugular;other (see comments) (Active)   Dressing biopatch in place;dressing dry and intact 9/26/2017  3:00 AM   Securement secured w/ sutures 9/26/2017  3:00 AM   Distal Patency/Care infusing 9/26/2017  3:00 AM   Medial 1 Patency/Care infusing 9/26/2017  3:00 AM   Medial 2 Patency/Care flushed w/o difficulty;normal saline lock 9/26/2017  3:00 AM   Proximal Patency/Care normal saline locked;flushed w/o difficulty 9/26/2017  3:00 AM   Waveform normal 9/26/2017  3:00 AM   Line Interventions line leveled/zeroed 9/26/2017  3:00 AM   Daily Line Review Performed 9/26/2017  3:00 AM   Number of days: 1            Peripheral IV - Single Lumen 09/25/17 0558 Left Forearm (Active)   Site Assessment Clean;Dry;Intact 9/26/2017  3:00 AM   Line Status Infusing 9/26/2017  3:00 AM   Dressing  "Status Clean;Dry;Intact 9/26/2017  3:00 AM   Dressing Intervention Dressing reinforced 9/26/2017  3:00 AM   Reason Not Rotated Not due 9/26/2017  3:00 AM   Number of days: 1            Peripheral IV - Single Lumen 09/25/17 0728 Right Forearm (Active)   Site Assessment Clean;Dry;Intact 9/26/2017  3:00 AM   Line Status Saline locked;Flushed 9/26/2017  3:00 AM   Dressing Status Dry;Clean;Intact 9/26/2017  3:00 AM   Dressing Intervention Dressing reinforced 9/26/2017  3:00 AM   Dressing Change Due 09/29/17 9/25/2017  1:18 PM   Site Change Due 09/29/17 9/25/2017  1:18 PM   Reason Not Rotated Not due 9/26/2017  3:00 AM   Number of days: 1            Arterial Line 09/25/17 0720 Left Radial (Active)   Site Assessment Clean;Dry;Intact;No redness;No swelling 9/26/2017  3:00 AM   Line Status Positional 9/26/2017  3:00 AM   Art Line Waveform Appropriate 9/26/2017  3:00 AM   Arterial Line Interventions Zeroed and calibrated;Tubing changed;Leveled;Connections checked and tightened;Flushed per protocol 9/26/2017  3:00 AM   Dressing Type Transparent 9/26/2017  3:00 AM   Dressing Status Clean;Dry;Intact 9/26/2017  3:00 AM   Dressing Intervention Dressing reinforced 9/26/2017  3:00 AM   Number of days: 1            Urethral Catheter 09/25/17 0740 Non-latex;Straight-tip;Temperature probe 16 Fr. (Active)   Site Assessment Clean;Intact 9/26/2017  3:00 AM   Collection Container Urimeter 9/26/2017  3:00 AM   Securement Method secured to top of thigh w/ adhesive device 9/26/2017  3:00 AM   Catheter Care Performed yes 9/26/2017  3:00 AM   Reason for Continuing Urinary Catheterization Critically ill in ICU requiring intensive monitoring 9/26/2017  3:00 AM   CAUTI Prevention Bundle StatLock in place w 1" slack;Intact seal between catheter & drainage tubing;Green sheeting clip in use;Drainage bag off the floor;No dependent loops or kinks;Drainage bag not overfilled (<2/3 full);Drainage bag below bladder 9/25/2017  7:00 PM   Output (mL) 45 mL " 9/26/2017  8:00 AM   Number of days: 1            Y Chest Tube 1 and 2 09/25/17 1200 1 Mediastinal 19 Fr. 2 Mediastinal 19 Fr. (Active)   Function -20 cm H2O 9/26/2017  3:00 AM   Air Leak/Fluctuation air leak not present;fluctuation not present 9/26/2017  3:00 AM   Safety all tubing connections taped;all connections secured;suction checked 9/26/2017  3:00 AM   Securement tubing anchored to body distal to insertion site w/ tape 9/26/2017  3:00 AM   Left Subcutaneous Emphysema none present 9/26/2017  3:00 AM   Right Subcutaneous Emphysema none present 9/26/2017  3:00 AM   Patency Intervention Milked;Stripped;Tip/tilt 9/26/2017  3:00 AM   Drainage Description 1 Sanguineous 9/26/2017  3:00 AM   Tube 1 Dressing Appearance occlusive gauze dressing intact;clean and dry 9/26/2017  3:00 AM   Tube 1 Dressing Care dressing reinforced 9/26/2017  3:00 AM   Site Assessment 1 Not assessed 9/26/2017  3:00 AM   Surrounding Skin 1 Unable to view 9/26/2017  3:00 AM   Drainage Description 2 Sanguineous 9/26/2017  3:00 AM   Tube 2 Dressing Appearance occlusive gauze dressing intact;clean and dry 9/26/2017  3:00 AM   Site Assessment 2 Not assessed 9/26/2017  3:00 AM   Surrounding Skin Unable to view 9/26/2017  3:00 AM   Output (mL) 70 mL 9/26/2017  7:15 AM   Number of days: 0       Laboratory (Last 24H):  CBC:    Recent Labs  Lab 09/26/17 0314   WBC 16.68*   HGB 8.8*   HCT 29.0*        CMP:    Recent Labs  Lab 09/26/17  0314 09/26/17  0800   CALCIUM 8.3*  --      --    K 4.1 4.2   CO2 24  --      --    BUN 7  --    CREATININE 0.7  --      ABGs:   Recent Labs  Lab 09/25/17  1321   PH 7.417   PCO2 40.0   HCO3 25.8   POCSATURATED 100   BE 1       Chest X-Ray: Reviewed     ASSESSMENT/PLAN:         Plan:  Neuro:   - PRN pain meds    Pulmonary:   - Wean supplemental O2    Cardiac:  - Cardene this AM for HTN  - Start PRN Hydralazine  - CTs in place to suction   - Start BB today     Renal:   - Rivas catheter  - Strict I/Os  -  Lasix 20 BID    Infectious Disease:   - Post op abx    Hematology/Oncology:  - H/H stable    Endocrine:  - Insulin gtt    Fluids/Electrolytes/Nutrition/GI:   - Advance diet, cardiac diet with 1500mL fluid restriction   - Replace lytes PRN     Dispo:  - Stepdown to CTSU     Rosales Ruiz M.D.  General Surgery PGY2  [phone]

## 2017-09-26 NOTE — PT/OT/SLP EVAL
Physical Therapy  Evaluation    Karolina Jacobs   MRN: [id number]   Admitting Diagnosis: Mitral regurgitation    PT Received On: 09/26/17  PT Start Time: 0845     PT Stop Time: 0855    PT Total Time (min): 10 min       Billable Minutes:  Evaluation 10 min    Diagnosis: Mitral regurgitation  S/p MVr, resection of leaflet of mitral valve 9/25/17    Past Medical History:   Diagnosis Date    MAGDALENO (dyspnea on exertion) 8/22/2017    Essential hypertension 8/22/2017    Iron deficiency anemia due to chronic blood loss 8/22/2017    Non-rheumatic mitral regurgitation 8/22/2017      Past Surgical History:   Procedure Laterality Date    TUBAL LIGATION  1990's       Referring physician: Rosales Ruiz  Date referred to PT: 9/25/17    General Precautions: Standard, sternal, fall  Orthopedic Precautions:     Braces:         Do you have any cultural, spiritual, Buddhism conflicts, given your current situation?: none    Patient History:  Lives With: child(guido), adult (pt works full-time as  and her daughter is unemployed and will be her caregiver.)  Living Arrangements: house (ramp access with 1 handrail)  Equipment Currently Used at Home: none  DME owned (not currently used): none    Previous Level of Function:  Ambulation Skills: independent  Transfer Skills: independent    Subjective:  Communicated with nurse prior to session.    Chief Complaint: pt c/o pain during treatment.   Patient goals:  To get better and go home.     Pain/Comfort  Pain Rating 1: 2/10  Location 1:  (chest)  Pain Rating Post-Intervention 1: 2/10      Objective:   Patient found with: telemetry, arterial line, pulse ox (continuous), schumacher catheter, chest tube, central line     Cognitive Exam:  Oriented to: Person, Place, Time and Situation    Follows Commands/attention: Follows multistep  commands  Communication: clear/fluent  Safety awareness/insight to disability: intact    Physical Exam:    Lower Extremity Range of Motion:  Right Lower  Extremity: WFL  Left Lower Extremity: WFL    Lower Extremity Strength:  Right Lower Extremity: WFL  Left Lower Extremity: WFL       Functional Mobility:  Bed Mobility:  Rolling/Turning to Left:  (not tested. pt was sitting in chair for treatment. )    Transfers:  Sit <> Stand Assistance: Minimum Assistance (pt needed verbal cues for functional mobility with sternal precautions. )  Sit <> Stand Assistive Device: No Assistive Device    Gait:   Gait Distance: 3 steps forward/backward x 2 reps. Distance pt ambulated limited by medical lines  Assistance 1: Minimum assistance  Gait Assistive Device: Hand held assist  Gait Pattern: 2-point gait      Therapeutic Activities and Exercises:   pt and daughter received verbal and written instructions in sternal precautions and both verbally expressed understanding of such.     AM-PAC 6 CLICK MOBILITY  How much help from another person does this patient currently need?   1 = Unable, Total/Dependent Assistance  2 = A lot, Maximum/Moderate Assistance  3 = A little, Minimum/Contact Guard/Supervision  4 = None, Modified [place]/Independent    Turning over in bed (including adjusting bedclothes, sheets and blankets)?: 3  Sitting down on and standing up from a chair with arms (e.g., wheelchair, bedside commode, etc.): 3  Moving from lying on back to sitting on the side of the bed?: 3  Moving to and from a bed to a chair (including a wheelchair)?: 3  Need to walk in hospital room?: 3  Climbing 3-5 steps with a railing?: 3  Total Score: 18     AM-PAC Raw Score CMS G-Code Modifier Level of Impairment Assistance   6 % Total / Unable   7 - 9 CM 80 - 100% Maximal Assist   10 - 14 CL 60 - 80% Moderate Assist   15 - 19 CK 40 - 60% Moderate Assist   20 - 22 CJ 20 - 40% Minimal Assist   23 CI 1-20% SBA / CGA   24 CH 0% Independent/ Mod I     Patient left up in chair with all lines intact, call button in reach and daughter present.    Assessment:   Karolina ALCON Reynaldo is a 52 y.o. female  with a medical diagnosis of Mitral regurgitation and presents with decreased mobility, transfers and decreased distance ambulated. Pt would benefit from cont PT to address deficits and will be able to discharge home with no therapy or DME needs. Pt will benefit from skilled PT 5x/wk to return pt to Independent status. Pt is s/p MVr, resection of leaflet of mitral valve 17..    Rehab identified problem list/impairments: Rehab identified problem list/impairments: impaired endurance, impaired functional mobilty, gait instability    Rehab potential is good.    Activity tolerance: Good    Discharge recommendations: Discharge Facility/Level Of Care Needs:  (no further PT will be needed.)     Barriers to discharge: Barriers to Discharge: None    Equipment recommendations: Equipment Needed After Discharge: none     GOALS:    Physical Therapy Goals        Problem: Physical Therapy Goal    Goal Priority Disciplines Outcome Goal Variances Interventions   Physical Therapy Goal     PT/OT, PT      Description:  Goals to be met by: 10/3/17    Patient will increase functional independence with mobility by performin. Supine to sit with [place] - not met  2. Sit to stand transfer with [place] -not met  3. Gait  x 220 feet with [place] - not met                    PLAN:    Patient to be seen 5 x/week to address the above listed problems via gait training, therapeutic activities  Plan of Care expires: 10/24/17  Plan of Care reviewed with: patient, daughter          Helen BENZ Aleyda, PT  2017

## 2017-09-26 NOTE — PT/OT/SLP EVAL
"Occupational Therapy  Evaluation    Karolina Jacobs   MRN: [id number]   Admitting Diagnosis: Mitral regurgitation    OT Date of Treatment: 09/26/17   OT Start Time: 0840  OT Stop Time: 0854  OT Total Time (min): 14 min    Billable Minutes:  Evaluation 14    Diagnosis: Mitral regurgitation   S/p MVR 9/25/17    Past Medical History:   Diagnosis Date    MAGDALENO (dyspnea on exertion) 8/22/2017    Essential hypertension 8/22/2017    Iron deficiency anemia due to chronic blood loss 8/22/2017    Non-rheumatic mitral regurgitation 8/22/2017      Past Surgical History:   Procedure Laterality Date    TUBAL LIGATION  1990's       Referring physician: Joseph  Date referred to OT: 9/25/17    General Precautions: Standard, fall, sternal  Orthopedic Precautions:    Braces:      Do you have any cultural, spiritual, Voodoo conflicts, given your current situation?: none reported     Patient History:  Living Environment  Lives With: child(guido), adult  Living Arrangements: house (ramp access)  Home Accessibility:  (ramp access)  Transportation Available: family or friend will provide  Living Environment Comment: Pt's dtr will be at home to assist as needed upon d/c  Equipment Currently Used at Home: none    Prior level of function:   Bed Mobility/Transfers: independent  Grooming: independent  Bathing: independent  Upper Body Dressing: independent  Lower Body Dressing: independent  Toileting: independent  Driving License: Yes  Occupation: Full time employment  Type of Occupation:      Dominant hand: right    Subjective:  Communicated with RN prior to session.    Chief Complaint: "I have to get better so I can get back to my job."  Patient/Family stated goals: to get better    Pain/Comfort  Pain Rating 1: 2/10  Location - Side 1: Bilateral  Location - Orientation 1: midline  Location 1: sternal  Pain Addressed 1: Pre-medicate for activity, Reposition, Distraction  Pain Rating Post-Intervention 1: 2/10    Objective:  Patient " found with: arterial line, blood pressure cuff, central line, chest tube, schumacher catheter, telemetry, pulse ox (continuous)    Cognitive Exam:  Oriented to: Person, Place, Time and Situation  Follows Commands/attention: Follows multistep  commands  Communication: clear/fluent  Memory:  No Deficits noted  Safety awareness/insight to disability: intact  Coping skills/emotional control: Appropriate to situation    Visual/perceptual:  Intact    Physical Exam:  Postural examination/scapula alignment: No postural abnormalities identified  Skin integrity: Visible skin intact  Edema: None noted     Sensation:   Intact    Upper Extremity Range of Motion:  Right Upper Extremity: WFL  Left Upper Extremity: WFL    Upper Extremity Strength:  Right Upper Extremity: WFL  Left Upper Extremity: WFL   Strength: Good    Fine motor coordination:   Intact    Gross motor coordination: WFL    Functional Mobility:  Bed Mobility:  Rolling/Turning to Left:  (from b/s chair)    Transfers:  Sit <> Stand Assistance: Minimum Assistance (from b/s chair)  Sit <> Stand Assistive Device: No Assistive Device    Functional Ambulation: CGA forwards and backwards    Activities of Daily Living:  Feeding Level of Assistance: Set-up Assistance  Feeding adaptive equipment:   UE Dressing Level of Assistance: Moderate assistance  UE adaptive equipment:   LE Dressing Level of Assistance: Moderate assistance  LE adaptive equipment:   Grooming Position: Seated (declined standing for activity)  Grooming Level of Assistance: Supervision     Therapeutic Activities and Exercises:  Pt ed on role of OT and sternal precautions during ADL    AM-PAC 6 CLICK ADL  How much help from another person does this patient currently need?  1 = Unable, Total/Dependent Assistance  2 = A lot, Maximum/Moderate Assistance  3 = A little, Minimum/Contact Guard/Supervision  4 = None, Modified [place]/Independent    Putting on and taking off regular lower body clothing? :  "2  Bathing (including washing, rinsing, drying)?: 2  Toileting, which includes using toilet, bedpan, or urinal? : 2  Putting on and taking off regular upper body clothing?: 2  Taking care of personal grooming such as brushing teeth?: 3  Eating meals?: 3  Total Score: 14    AM-PAC Raw Score CMS "G-Code Modifier Level of Impairment Assistance   6 % Total / Unable   7 - 9 CM 80 - 100% Maximal Assist   10-14 CL 60 - 80% Moderate Assist   15 - 19 CK 40 - 60% Moderate Assist   20 - 22 CJ 20 - 40% Minimal Assist   23 CI 1-20% SBA / CGA   24 CH 0% Independent/ Mod I       Patient left up in chair with all lines intact, call button in reach, RN notified and dtr present    Assessment:  Karolina Jacobs is a 52 y.o. female with a medical diagnosis of Mitral regurgitation and presents with decreased overall strength and endurance s/p MVR.    Rehab identified problem list/impairments: Rehab identified problem list/impairments: weakness, impaired endurance, impaired self care skills, gait instability, impaired functional mobilty, impaired balance, decreased upper extremity function, impaired cardiopulmonary response to activity, decreased ROM, pain    Rehab potential is good.    Activity tolerance: Good    Discharge recommendations: Discharge Facility/Level Of Care Needs: home with home health     Barriers to discharge: Barriers to Discharge: None    Equipment recommendations: none     GOALS:    Occupational Therapy Goals        Problem: Occupational Therapy Goal    Goal Priority Disciplines Outcome Interventions   Occupational Therapy Goal     OT, PT/OT Ongoing (interventions implemented as appropriate)    Description:  Goals to be met by: 10/06/17     Patient will increase functional independence with ADLs by performing:    Feeding with [place].  UE Dressing with Supervision.  LE Dressing with Supervision.  Grooming while standing at sink with Supervision.  Toileting from toilet with Supervision for hygiene and " clothing management.   Toilet transfer to toilet with Supervision.                      PLAN:  Patient to be seen 4 x/week to address the above listed problems via self-care/home management, therapeutic activities, therapeutic exercises  Plan of Care expires: 10/26/17  Plan of Care reviewed with: patient, daughter         Alicia Couch, G[place]ON  09/26/2017

## 2017-09-26 NOTE — CONSULTS
"Cardiac Rehab     Karolina Jacobs   [id number]   9/26/2017       Activity taught: Yes    Cardiac Rehab Phase Taught: Phase 1 & 2    Risk Factors-Modifiable: nutrition, hypertension, exercise    Risk Factors-Non modifiable: none    Teaching Method: Verbal, Written, Living with Heart Disease Booklet    Understanding: Yes    Response: Verbalized understanding and questions answered. She is interested in going to cardiac rehab at [facility] in [place].    Comments: S/P MVR. Discussed cardiac rehab and risk factor modification. Team to refer patient to [facility] Cardiac [facility] Phase II. Educational materials were used in the process and given to the patient. They included "Your Guide to Living with Heart Disease", Phase Two Cardiac Rehabilitation information along with a sample Mediterranean diet.The patient expressed understanding of the teaching and expressed desire to take a role in modifying the risk factors when they return home.        "

## 2017-09-26 NOTE — PLAN OF CARE
S/P MVr on 9/25/17. CM visited with pt and daughter. No dc needs assessed. CM gave pt heart surgery pillow to splint incision to cough and deep breathe. Will cont to follow.        09/26/17 1318   Discharge Assessment   Assessment Type Discharge Planning Assessment   Confirmed/corrected address and phone number on facesheet? Yes   Assessment information obtained from? Patient;Caregiver   Expected Length of Stay (days) 4.5   Prior to hospitilization cognitive status: Alert/Oriented   Prior to hospitalization functional status: Independent   Current cognitive status: Alert/Oriented   Current Functional Status: Independent   Lives With child(guido), adult   Able to Return to Prior Arrangements yes   Is patient able to care for self after discharge? Yes   Who are your caregiver(s) and their phone number(s)? Kelin/daughter [phone] cell    Patient's perception of discharge disposition admitted as an inpatient   Readmission Within The Last 30 Days no previous admission in last 30 days   Patient currently being followed by outpatient case management? No   Patient currently receives any other outside agency services? No   Equipment Currently Used at Home none   Do you have any problems affording any of your prescribed medications? No   Is the patient taking medications as prescribed? yes   Does the patient have transportation home? Yes   Transportation Available family or friend will provide   Does the patient receive services at the [facility]? No   Discharge Plan A Home with family   Discharge Plan B Home with family;Home Health   Patient/Family In Agreement With Plan yes

## 2017-09-26 NOTE — NURSING TRANSFER
Nursing Transfer Note      9/26/2017     Transfer To: 304    Transfer via wheelchair    Transfer with cardiac monitoring    Transported by SHAEN Boles, RN     Medicines sent: none    Chart send with patient: Yes    Notified: daughter    Patient reassessed at: 1345, 9/26/17    Upon arrival to floor: cardiac monitor applied, patient oriented to room, call bell in reach and bed in lowest position

## 2017-09-26 NOTE — PLAN OF CARE
Problem: Spiritual Distress, Risk/Actual (Adult,Obstetrics,Pediatric)  Intervention: Facilitate Personal Exploration/Expression of Spirituality  F - Nga and Belief: Pt of Advent nga, and relying on her nga for strength. Pt expressed loss of her  in 2016.     I - Importance: Nga is an important source of strength for pt.     C - Community: Pt's daughter present. Pt's Jainism family and pastors involved in care.     A - Address in Care: Introduced and offered pastoral support with reflective listening and prayer upon request. Pt made aware of 's presence as needed.

## 2017-09-27 LAB
ANION GAP SERPL CALC-SCNC: 8 MMOL/L
BASOPHILS # BLD AUTO: 0.02 K/UL
BASOPHILS NFR BLD: 0.1 %
BUN SERPL-MCNC: 12 MG/DL
CALCIUM SERPL-MCNC: 8.9 MG/DL
CHLORIDE SERPL-SCNC: 100 MMOL/L
CO2 SERPL-SCNC: 26 MMOL/L
CREAT SERPL-MCNC: 0.8 MG/DL
DIFFERENTIAL METHOD: ABNORMAL
EOSINOPHIL # BLD AUTO: 0 K/UL
EOSINOPHIL NFR BLD: 0.2 %
ERYTHROCYTE [DISTWIDTH] IN BLOOD BY AUTOMATED COUNT: 15.8 %
EST. GFR  (AFRICAN AMERICAN): >60 ML/MIN/1.73 M^2
EST. GFR  (NON AFRICAN AMERICAN): >60 ML/MIN/1.73 M^2
GLUCOSE SERPL-MCNC: 110 MG/DL
HCT VFR BLD AUTO: 26.6 %
HGB BLD-MCNC: 8.1 G/DL
LYMPHOCYTES # BLD AUTO: 1.8 K/UL
LYMPHOCYTES NFR BLD: 9.2 %
MAGNESIUM SERPL-MCNC: 2.2 MG/DL
MCH RBC QN AUTO: 20.6 PG
MCHC RBC AUTO-ENTMCNC: 30.5 G/DL
MCV RBC AUTO: 68 FL
MONOCYTES # BLD AUTO: 1.8 K/UL
MONOCYTES NFR BLD: 9.3 %
NEUTROPHILS # BLD AUTO: 15.4 K/UL
NEUTROPHILS NFR BLD: 80.9 %
PHOSPHATE SERPL-MCNC: 2.7 MG/DL
PLATELET # BLD AUTO: 204 K/UL
PMV BLD AUTO: 10.7 FL
POCT GLUCOSE: 107 MG/DL (ref 70–110)
POCT GLUCOSE: 113 MG/DL (ref 70–110)
POCT GLUCOSE: 127 MG/DL (ref 70–110)
POCT GLUCOSE: 129 MG/DL (ref 70–110)
POCT GLUCOSE: 139 MG/DL (ref 70–110)
POCT GLUCOSE: 145 MG/DL (ref 70–110)
POCT GLUCOSE: 146 MG/DL (ref 70–110)
POCT GLUCOSE: 148 MG/DL (ref 70–110)
POCT GLUCOSE: 161 MG/DL (ref 70–110)
POTASSIUM SERPL-SCNC: 4.4 MMOL/L
RBC # BLD AUTO: 3.93 M/UL
SODIUM SERPL-SCNC: 134 MMOL/L
WBC # BLD AUTO: 19.03 K/UL

## 2017-09-27 PROCEDURE — 84100 ASSAY OF PHOSPHORUS: CPT

## 2017-09-27 PROCEDURE — 99232 SBSQ HOSP IP/OBS MODERATE 35: CPT | Mod: ,,, | Performed by: NURSE PRACTITIONER

## 2017-09-27 PROCEDURE — 85025 COMPLETE CBC W/AUTO DIFF WBC: CPT

## 2017-09-27 PROCEDURE — 83735 ASSAY OF MAGNESIUM: CPT

## 2017-09-27 PROCEDURE — 20600001 HC STEP DOWN PRIVATE ROOM

## 2017-09-27 PROCEDURE — 94761 N-INVAS EAR/PLS OXIMETRY MLT: CPT

## 2017-09-27 PROCEDURE — 25000003 PHARM REV CODE 250: Performed by: STUDENT IN AN ORGANIZED HEALTH CARE EDUCATION/TRAINING PROGRAM

## 2017-09-27 PROCEDURE — 25000003 PHARM REV CODE 250: Performed by: NURSE PRACTITIONER

## 2017-09-27 PROCEDURE — A4216 STERILE WATER/SALINE, 10 ML: HCPCS | Performed by: STUDENT IN AN ORGANIZED HEALTH CARE EDUCATION/TRAINING PROGRAM

## 2017-09-27 PROCEDURE — 80048 BASIC METABOLIC PNL TOTAL CA: CPT

## 2017-09-27 PROCEDURE — 63600175 PHARM REV CODE 636 W HCPCS: Performed by: STUDENT IN AN ORGANIZED HEALTH CARE EDUCATION/TRAINING PROGRAM

## 2017-09-27 RX ORDER — IBUPROFEN 200 MG
24 TABLET ORAL
Status: DISCONTINUED | OUTPATIENT
Start: 2017-09-27 | End: 2017-09-28

## 2017-09-27 RX ORDER — METOPROLOL TARTRATE 25 MG/1
25 TABLET, FILM COATED ORAL EVERY 12 HOURS
Status: DISCONTINUED | OUTPATIENT
Start: 2017-09-27 | End: 2017-09-28

## 2017-09-27 RX ORDER — IBUPROFEN 200 MG
16 TABLET ORAL
Status: DISCONTINUED | OUTPATIENT
Start: 2017-09-27 | End: 2017-09-28

## 2017-09-27 RX ORDER — GLUCAGON 1 MG
1 KIT INJECTION
Status: DISCONTINUED | OUTPATIENT
Start: 2017-09-27 | End: 2017-09-28

## 2017-09-27 RX ORDER — INSULIN ASPART 100 [IU]/ML
0-5 INJECTION, SOLUTION INTRAVENOUS; SUBCUTANEOUS
Status: DISCONTINUED | OUTPATIENT
Start: 2017-09-27 | End: 2017-09-28

## 2017-09-27 RX ORDER — PANTOPRAZOLE SODIUM 40 MG/1
40 TABLET, DELAYED RELEASE ORAL DAILY
Status: DISCONTINUED | OUTPATIENT
Start: 2017-09-27 | End: 2017-09-28

## 2017-09-27 RX ADMIN — FUROSEMIDE 20 MG: 10 INJECTION, SOLUTION INTRAMUSCULAR; INTRAVENOUS at 09:09

## 2017-09-27 RX ADMIN — MUPIROCIN 1 G: 20 OINTMENT TOPICAL at 08:09

## 2017-09-27 RX ADMIN — OXYCODONE HYDROCHLORIDE 10 MG: 5 TABLET ORAL at 01:09

## 2017-09-27 RX ADMIN — DOCUSATE SODIUM 100 MG: 100 CAPSULE, LIQUID FILLED ORAL at 09:09

## 2017-09-27 RX ADMIN — MUPIROCIN 1 G: 20 OINTMENT TOPICAL at 09:09

## 2017-09-27 RX ADMIN — POLYETHYLENE GLYCOL 3350 17 G: 17 POWDER, FOR SOLUTION ORAL at 09:09

## 2017-09-27 RX ADMIN — ASPIRIN 325 MG ORAL TABLET 325 MG: 325 PILL ORAL at 09:09

## 2017-09-27 RX ADMIN — CEFAZOLIN SODIUM 2 G: 2 SOLUTION INTRAVENOUS at 04:09

## 2017-09-27 RX ADMIN — POTASSIUM CHLORIDE 20 MEQ: 1500 TABLET, EXTENDED RELEASE ORAL at 08:09

## 2017-09-27 RX ADMIN — Medication 3 ML: at 02:09

## 2017-09-27 RX ADMIN — Medication 3 ML: at 04:09

## 2017-09-27 RX ADMIN — POTASSIUM CHLORIDE 20 MEQ: 1500 TABLET, EXTENDED RELEASE ORAL at 09:09

## 2017-09-27 RX ADMIN — BISACODYL 10 MG: 10 SUPPOSITORY RECTAL at 03:09

## 2017-09-27 RX ADMIN — ATORVASTATIN CALCIUM 40 MG: 20 TABLET, FILM COATED ORAL at 08:09

## 2017-09-27 RX ADMIN — METOPROLOL TARTRATE 25 MG: 25 TABLET ORAL at 08:09

## 2017-09-27 RX ADMIN — METOPROLOL TARTRATE 25 MG: 25 TABLET ORAL at 09:09

## 2017-09-27 RX ADMIN — OXYCODONE HYDROCHLORIDE 10 MG: 5 TABLET ORAL at 10:09

## 2017-09-27 RX ADMIN — PANTOPRAZOLE SODIUM 40 MG: 40 TABLET, DELAYED RELEASE ORAL at 09:09

## 2017-09-27 RX ADMIN — FUROSEMIDE 20 MG: 10 INJECTION, SOLUTION INTRAMUSCULAR; INTRAVENOUS at 06:09

## 2017-09-27 RX ADMIN — DOCUSATE SODIUM 100 MG: 100 CAPSULE, LIQUID FILLED ORAL at 08:09

## 2017-09-27 RX ADMIN — OXYCODONE HYDROCHLORIDE 10 MG: 5 TABLET ORAL at 03:09

## 2017-09-27 NOTE — PLAN OF CARE
Problem: Patient Care Overview  Goal: Individualization & Mutuality  Outcome: Ongoing (interventions implemented as appropriate)  Pt remains free from falls and injury. Plan of care reviewed with pt. Pt understands plan. Pt denies pain, VSS. Incision with ointment is open to air. Pt able to walk halls with standby assist. Chest tubes in place. Will continue to monitor.

## 2017-09-27 NOTE — PROGRESS NOTES
Progress Note  Cardiothoracic Surgery    Admit Date: 9/25/2017  Post-operative Day: 2 Days Post-Op  Hospital Day: 3    SUBJECTIVE: Pt was transferred down from ICU last night. Pt NSR on monitor.     Follow-up For: Procedure(s) (LRB):  REPAIR VALVE-MITRAL (N/A)    Scheduled Meds:   aspirin  325 mg Oral Daily    atorvastatin  40 mg Oral QHS    docusate sodium  100 mg Oral BID    furosemide  20 mg Intravenous BID    metoprolol tartrate  25 mg Oral Q12H    mupirocin  1 g Nasal BID    pantoprazole  40 mg Oral Daily    polyethylene glycol  17 g Oral Daily    potassium chloride  20 mEq Oral Q12H    sodium chloride 0.9%  3 mL Intravenous Q8H     Infusions/Drips:   insulin (HUMAN R) infusion (adults) Stopped (09/26/17 2100)     PRN Meds: acetaminophen, albuterol-ipratropium 2.5mg-0.5mg/3mL, bisacodyl, dextrose 50%, dextrose 50%, glucagon (human recombinant), glucose, glucose, hydrALAZINE, insulin aspart, metoclopramide HCl, ondansetron, oxycodone, oxycodone    Review of patient's allergies indicates:   Allergen Reactions    Sulfa (sulfonamide antibiotics) Other (See Comments)     Unknown, pt was a child when she had reaction       OBJECTIVE:     Vital Signs (Most Recent)  Temp: 98.1 °F (36.7 °C) (09/27/17 0918)  Pulse: 105 (09/27/17 0918)  Resp: 18 (09/27/17 0918)  BP: (!) 122/57 (09/27/17 0918)  SpO2: (!) 93 % (09/27/17 0918)    Admission Weight: 90.7 kg (200 lb) (09/25/17 0543)   Most Recent Weight: 92.2 kg (203 lb 4.2 oz) (09/27/17 0800)    Vital Signs Range (Last 24H):  Temp:  [98.1 °F (36.7 °C)-99.2 °F (37.3 °C)]   Pulse:  []   Resp:  [13-20]   BP: (103-122)/(57-73)   SpO2:  [86 %-100 %]   Arterial Line BP: ()/(51-59)     I & O (Last 24H):  Intake/Output Summary (Last 24 hours) at 09/27/17 0937  Last data filed at 09/27/17 0532   Gross per 24 hour   Intake          1465.76 ml   Output             1890 ml   Net          -424.24 ml     Physical Exam:  General: well developed, well nourished, no  distress  Lungs:  clear to auscultation bilaterally and normal respiratory effort  Chest Wall: no tenderness  Heart: regular rate and rhythm, S1, S2 normal, no murmur, rub or gallop  Abdomen: soft nontender   Skin: Skin color, texture, turgor normal. No rashes or lesions  Neurologic: Alert and oriented. Thought content appropriate    Wound/Incision:  clean, dry, intact    Laboratory:  CBC:   Recent Labs  Lab 09/27/17  0408   WBC 19.03*   RBC 3.93*   HGB 8.1*   HCT 26.6*      MCV 68*   MCH 20.6*   MCHC 30.5*     BMP:   Recent Labs  Lab 09/27/17  0408      *   K 4.4      CO2 26   BUN 12   CREATININE 0.8   CALCIUM 8.9   MG 2.2       Diagnostic Results:  X-Ray: Reviewed    ASSESSMENT/PLAN:     Assessment:   Active Hospital Problems    Diagnosis    Hyperglycemia    S/P mitral valve repair    Non-rheumatic mitral regurgitation     Plan:   Sternal Precautions  PT/OT  Ambulate  S/P MVr: continue asa, increased lopressor   Lasix BID  Continue to monitor chest tube output  Discharge planning ongoing

## 2017-09-27 NOTE — ANESTHESIA POSTPROCEDURE EVALUATION
"Anesthesia Post Evaluation    Patient: Karolina Jacobs    Procedure(s) Performed: Procedure(s) (LRB):  REPAIR VALVE-MITRAL (N/A)    Final Anesthesia Type: general  Patient location during evaluation: ICU  Patient participation: Yes- Able to Participate  Level of consciousness: awake and alert  Post-procedure vital signs: reviewed and stable  Pain management: adequate  Airway patency: patent  PONV status at discharge: No PONV  Anesthetic complications: no      Cardiovascular status: blood pressure returned to baseline  Respiratory status: unassisted  Hydration status: euvolemic  Follow-up not needed.        Visit Vitals  /63 (BP Location: Left arm, Patient Position: Sitting)   Pulse 101   Temp 37.2 °C (99 °F) (Oral)   Resp 16   Ht 5' 6" (1.676 m)   Wt 90.9 kg (200 lb 6.4 oz)   LMP 09/18/2017   SpO2 95%   Breastfeeding? No   BMI 32.35 kg/m²       Pain/Jose Enrique Score: Pain Assessment Performed: Yes (9/26/2017  5:43 PM)  Presence of Pain: denies (9/26/2017  5:43 PM)  Pain Rating Prior to Med Admin: 7 (9/26/2017  6:25 PM)  Pain Rating Post Med Admin: 2 (9/26/2017  8:51 AM)      "

## 2017-09-27 NOTE — SUBJECTIVE & OBJECTIVE
"Interval HPI:   OOB to chair. Insulin drip d/c'd last night. BG below goal this AM. No hypoglycemia. Tolerating small amount of diet, < 25%. No nausea. Afebrile.     BP (!) 122/57 (BP Location: Right arm, Patient Position: Sitting)   Pulse 105   Temp 98.1 °F (36.7 °C) (Oral)   Resp 18   Ht 5' 6" (1.676 m)   Wt 92.2 kg (203 lb 4.2 oz)   LMP 09/18/2017   SpO2 (!) 93%   Breastfeeding? No   BMI 32.81 kg/m²     Labs Reviewed and Include      Recent Labs  Lab 09/27/17  0408      CALCIUM 8.9   *   K 4.4   CO2 26      BUN 12   CREATININE 0.8     Lab Results   Component Value Date    WBC 19.03 (H) 09/27/2017    HGB 8.1 (L) 09/27/2017    HCT 26.6 (L) 09/27/2017    MCV 68 (L) 09/27/2017     09/27/2017     No results for input(s): TSH, FREET4 in the last 168 hours.  Lab Results   Component Value Date    HGBA1C 5.3 09/21/2017       Nutritional status:   Body mass index is 32.81 kg/m².  Lab Results   Component Value Date    ALBUMIN 3.6 09/21/2017     No results found for: PREALBUMIN    Estimated Creatinine Clearance: 96.3 mL/min (based on SCr of 0.8 mg/dL).    Accu-Checks  Recent Labs      09/25/17   2351  09/26/17   0112  09/26/17   0207  09/26/17   0312  09/26/17   0402  09/26/17   0459  09/26/17   1421  09/26/17   1643  09/26/17   2053  09/26/17   2129   POCTGLUCOSE  170*  146*  154*  146*  131*  165*  131*  127*  96  107       Current Medications and/or Treatments Impacting Glycemic Control  Immunotherapy:  Immunosuppressants     None        Steroids:   Hormones     None        Pressors:    Autonomic Drugs     None        Hyperglycemia/Diabetes Medications: Antihyperglycemics     Start     Stop Route Frequency Ordered    09/26/17 1800  insulin regular (Humulin R) 100 Units in sodium chloride 0.9% 100 mL infusion      -- IV Continuous 09/26/17 1650    09/26/17 1750  insulin aspart pen 0-4 Units      -- SubQ As needed (PRN) 09/26/17 1650        "

## 2017-09-27 NOTE — PROGRESS NOTES
"[facility]  Endocrinology  Progress Note    Admit Date: 9/25/2017     Reason for Consult: Management of Hyperglycemia     Surgical Procedure and Date: S/P Mitral valve repair 9/25/17    Diabetes diagnosis year: N/A    Home Diabetes Medications:  None    Hemoglobin A1C   Date Value Ref Range Status   09/21/2017 5.3 4.0 - 5.6 % Final       HPI:   Patient is a 52 y.o. female with a diagnosis of severe mitral regurgitation. She was symptomatic with dyspnea on exertion. S/p mitral valve repair. Endocrine consulted for management of post-op hyperglycemia.          Interval HPI:   OOB to chair. Insulin drip d/c'd last night. BG below goal this AM. No hypoglycemia. Tolerating small amount of diet, < 25%. No nausea. Afebrile.     BP (!) 122/57 (BP Location: Right arm, Patient Position: Sitting)   Pulse 105   Temp 98.1 °F (36.7 °C) (Oral)   Resp 18   Ht 5' 6" (1.676 m)   Wt 92.2 kg (203 lb 4.2 oz)   LMP 09/18/2017   SpO2 (!) 93%   Breastfeeding? No   BMI 32.81 kg/m²       Labs Reviewed and Include      Recent Labs  Lab 09/27/17  0408      CALCIUM 8.9   *   K 4.4   CO2 26      BUN 12   CREATININE 0.8     Lab Results   Component Value Date    WBC 19.03 (H) 09/27/2017    HGB 8.1 (L) 09/27/2017    HCT 26.6 (L) 09/27/2017    MCV 68 (L) 09/27/2017     09/27/2017     No results for input(s): TSH, FREET4 in the last 168 hours.  Lab Results   Component Value Date    HGBA1C 5.3 09/21/2017       Nutritional status:   Body mass index is 32.81 kg/m².  Lab Results   Component Value Date    ALBUMIN 3.6 09/21/2017     No results found for: PREALBUMIN    Estimated Creatinine Clearance: 96.3 mL/min (based on SCr of 0.8 mg/dL).    Accu-Checks  Recent Labs      09/25/17   2351  09/26/17   0112  09/26/17   0207  09/26/17   0312  09/26/17   0402  09/26/17   0459  09/26/17   1421  09/26/17   1643  09/26/17 2053 09/26/17 2129   POCTGLUCOSE  170*  146*  154*  146*  131*  165*  131*  127*  96 "  107       Current Medications and/or Treatments Impacting Glycemic Control  Immunotherapy:  Immunosuppressants     None        Steroids:   Hormones     None        Pressors:    Autonomic Drugs     None        Hyperglycemia/Diabetes Medications: Antihyperglycemics     Start     Stop Route Frequency Ordered    09/26/17 1800  insulin regular (Humulin R) 100 Units in sodium chloride 0.9% 100 mL infusion      -- IV Continuous 09/26/17 1650    09/26/17 1750  insulin aspart pen 0-4 Units      -- SubQ As needed (PRN) 09/26/17 1650          ASSESSMENT and PLAN    Hyperglycemia    BG goal 110-140. BG AC/HS with low dose correction. Sign off in AM if no insulin needs.      Discharge Planning: Anticipate continued resolution          Non-rheumatic mitral regurgitation    S/p MVR          S/P mitral valve repair    Per CTS              Sabiha Mckinney, FNP  Endocrinology  [facility]

## 2017-09-27 NOTE — PLAN OF CARE
Problem: Patient Care Overview  Goal: Plan of Care Review  Outcome: Ongoing (interventions implemented as appropriate)    Reviewed plan of care with pt and family; all questions/concerns addressed. VS and assessment per flow sheet, patient progressing towards goals as tolerated. Will continue to monitor.

## 2017-09-27 NOTE — NURSING
"CBG tonight was 96; insulin gtt stopped per standing order. CBG rechecked 30 minutes later, 106. Spoke with endocrinology, order received to D/C insulin "for tonight".  "

## 2017-09-28 VITALS
WEIGHT: 200.06 LBS | OXYGEN SATURATION: 95 % | SYSTOLIC BLOOD PRESSURE: 103 MMHG | DIASTOLIC BLOOD PRESSURE: 56 MMHG | TEMPERATURE: 99 F | BODY MASS INDEX: 32.15 KG/M2 | RESPIRATION RATE: 18 BRPM | HEART RATE: 82 BPM | HEIGHT: 66 IN

## 2017-09-28 DIAGNOSIS — Z98.890 S/P MVR (MITRAL VALVE REPAIR): Primary | ICD-10-CM

## 2017-09-28 PROBLEM — I34.0 NON-RHEUMATIC MITRAL REGURGITATION: Status: RESOLVED | Noted: 2017-08-22 | Resolved: 2017-09-28

## 2017-09-28 LAB
ANION GAP SERPL CALC-SCNC: 7 MMOL/L
ANISOCYTOSIS BLD QL SMEAR: SLIGHT
BASOPHILS # BLD AUTO: 0.02 K/UL
BASOPHILS NFR BLD: 0.2 %
BUN SERPL-MCNC: 12 MG/DL
CALCIUM SERPL-MCNC: 8.8 MG/DL
CHLORIDE SERPL-SCNC: 100 MMOL/L
CO2 SERPL-SCNC: 28 MMOL/L
CREAT SERPL-MCNC: 0.8 MG/DL
DIFFERENTIAL METHOD: ABNORMAL
EOSINOPHIL # BLD AUTO: 0.2 K/UL
EOSINOPHIL NFR BLD: 1.6 %
ERYTHROCYTE [DISTWIDTH] IN BLOOD BY AUTOMATED COUNT: 15.6 %
EST. GFR  (AFRICAN AMERICAN): >60 ML/MIN/1.73 M^2
EST. GFR  (NON AFRICAN AMERICAN): >60 ML/MIN/1.73 M^2
ESTIMATED PA SYSTOLIC PRESSURE: 17.47
GLUCOSE SERPL-MCNC: 117 MG/DL
HCT VFR BLD AUTO: 24.8 %
HGB BLD-MCNC: 7.4 G/DL
HYPOCHROMIA BLD QL SMEAR: ABNORMAL
LYMPHOCYTES # BLD AUTO: 1.7 K/UL
LYMPHOCYTES NFR BLD: 14.4 %
MAGNESIUM SERPL-MCNC: 2 MG/DL
MCH RBC QN AUTO: 20.2 PG
MCHC RBC AUTO-ENTMCNC: 29.8 G/DL
MCV RBC AUTO: 68 FL
MONOCYTES # BLD AUTO: 1.4 K/UL
MONOCYTES NFR BLD: 12.2 %
NEUTROPHILS # BLD AUTO: 8.3 K/UL
NEUTROPHILS NFR BLD: 71.6 %
OVALOCYTES BLD QL SMEAR: ABNORMAL
PHOSPHATE SERPL-MCNC: 2 MG/DL
PLATELET # BLD AUTO: 177 K/UL
PLATELET BLD QL SMEAR: ABNORMAL
PMV BLD AUTO: 10.7 FL
POIKILOCYTOSIS BLD QL SMEAR: SLIGHT
POLYCHROMASIA BLD QL SMEAR: ABNORMAL
POTASSIUM SERPL-SCNC: 4.4 MMOL/L
RBC # BLD AUTO: 3.66 M/UL
RETIRED EF AND QEF - SEE NOTES: 60 (ref 55–65)
SODIUM SERPL-SCNC: 135 MMOL/L
WBC # BLD AUTO: 11.58 K/UL

## 2017-09-28 PROCEDURE — 93306 TTE W/DOPPLER COMPLETE: CPT | Mod: 26,,, | Performed by: INTERNAL MEDICINE

## 2017-09-28 PROCEDURE — 85025 COMPLETE CBC W/AUTO DIFF WBC: CPT

## 2017-09-28 PROCEDURE — 36415 COLL VENOUS BLD VENIPUNCTURE: CPT

## 2017-09-28 PROCEDURE — 93306 TTE W/DOPPLER COMPLETE: CPT

## 2017-09-28 PROCEDURE — 25000003 PHARM REV CODE 250: Performed by: NURSE PRACTITIONER

## 2017-09-28 PROCEDURE — 25000003 PHARM REV CODE 250: Performed by: STUDENT IN AN ORGANIZED HEALTH CARE EDUCATION/TRAINING PROGRAM

## 2017-09-28 PROCEDURE — 63600175 PHARM REV CODE 636 W HCPCS: Performed by: STUDENT IN AN ORGANIZED HEALTH CARE EDUCATION/TRAINING PROGRAM

## 2017-09-28 PROCEDURE — 83735 ASSAY OF MAGNESIUM: CPT

## 2017-09-28 PROCEDURE — 80048 BASIC METABOLIC PNL TOTAL CA: CPT

## 2017-09-28 PROCEDURE — 84100 ASSAY OF PHOSPHORUS: CPT

## 2017-09-28 PROCEDURE — 97530 THERAPEUTIC ACTIVITIES: CPT

## 2017-09-28 PROCEDURE — 99231 SBSQ HOSP IP/OBS SF/LOW 25: CPT | Mod: ,,, | Performed by: NURSE PRACTITIONER

## 2017-09-28 RX ORDER — FUROSEMIDE 20 MG/1
20 TABLET ORAL 2 TIMES DAILY
Qty: 60 TABLET | Refills: 11 | Status: SHIPPED | OUTPATIENT
Start: 2017-09-28 | End: 2017-11-02 | Stop reason: ALTCHOICE

## 2017-09-28 RX ORDER — METOPROLOL TARTRATE 50 MG/1
50 TABLET ORAL EVERY 12 HOURS
Qty: 60 TABLET | Refills: 11 | Status: SHIPPED | OUTPATIENT
Start: 2017-09-28 | End: 2020-02-27 | Stop reason: SDUPTHER

## 2017-09-28 RX ORDER — ATORVASTATIN CALCIUM 40 MG/1
40 TABLET, FILM COATED ORAL NIGHTLY
Qty: 90 TABLET | Refills: 3 | Status: SHIPPED | OUTPATIENT
Start: 2017-09-28 | End: 2020-02-21

## 2017-09-28 RX ORDER — POTASSIUM CHLORIDE 750 MG/1
20 CAPSULE, EXTENDED RELEASE ORAL 2 TIMES DAILY
Qty: 60 CAPSULE | Refills: 11 | Status: SHIPPED | OUTPATIENT
Start: 2017-09-28 | End: 2017-11-02 | Stop reason: ALTCHOICE

## 2017-09-28 RX ORDER — ASPIRIN 325 MG
325 TABLET ORAL DAILY
Refills: 0 | COMMUNITY
Start: 2017-09-28 | End: 2020-02-21

## 2017-09-28 RX ORDER — METOPROLOL TARTRATE 50 MG/1
50 TABLET ORAL EVERY 12 HOURS
Status: DISCONTINUED | OUTPATIENT
Start: 2017-09-28 | End: 2017-09-28 | Stop reason: HOSPADM

## 2017-09-28 RX ORDER — OXYCODONE HYDROCHLORIDE 5 MG/1
5 TABLET ORAL
Qty: 60 TABLET | Refills: 0 | Status: SHIPPED | OUTPATIENT
Start: 2017-09-28 | End: 2017-11-02 | Stop reason: ALTCHOICE

## 2017-09-28 RX ORDER — DOCUSATE SODIUM 100 MG/1
100 CAPSULE, LIQUID FILLED ORAL DAILY PRN
Status: DISCONTINUED | OUTPATIENT
Start: 2017-09-28 | End: 2017-09-28 | Stop reason: HOSPADM

## 2017-09-28 RX ORDER — DOCUSATE SODIUM 100 MG/1
100 CAPSULE, LIQUID FILLED ORAL DAILY PRN
Refills: 0 | COMMUNITY
Start: 2017-09-28 | End: 2017-11-02 | Stop reason: ALTCHOICE

## 2017-09-28 RX ADMIN — FUROSEMIDE 20 MG: 10 INJECTION, SOLUTION INTRAMUSCULAR; INTRAVENOUS at 09:09

## 2017-09-28 RX ADMIN — DIBASIC SODIUM PHOSPHATE, MONOBASIC POTASSIUM PHOSPHATE AND MONOBASIC SODIUM PHOSPHATE 2 TABLET: 852; 155; 130 TABLET ORAL at 09:09

## 2017-09-28 RX ADMIN — METOPROLOL TARTRATE 50 MG: 25 TABLET ORAL at 09:09

## 2017-09-28 RX ADMIN — ASPIRIN 325 MG ORAL TABLET 325 MG: 325 PILL ORAL at 09:09

## 2017-09-28 RX ADMIN — POTASSIUM CHLORIDE 20 MEQ: 1500 TABLET, EXTENDED RELEASE ORAL at 09:09

## 2017-09-28 RX ADMIN — DOCUSATE SODIUM 100 MG: 100 CAPSULE, LIQUID FILLED ORAL at 09:09

## 2017-09-28 NOTE — PLAN OF CARE
Problem: Patient Care Overview  Goal: Plan of Care Review  Outcome: Ongoing (interventions implemented as appropriate)  Pt ambulating in hallway without difficulty.  Steady gait noted.  Reinforced sternal precautions and IS use.  Pt states understanding.  Lung diminished in the bases.  RA at 98%.  MST with minimal drainage noted.  Will cont to monitor.

## 2017-09-28 NOTE — DISCHARGE SUMMARY
[facility]  Cardiothoracic Surgery  Discharge Summary      Patient Name: Karolina Jacobs  MRN: [id number]  Admission Date: 9/25/2017  Hospital Length of Stay: 3 days  Discharge Date and Time:  09/28/2017 9:41 AM  Attending Physician: Philip Russell MD   Discharging Provider: Josee Sherman NP  Primary Care Provider: Bryant Young MD    HPI: Ms. Jacobs is a 52-year-old woman who initially presented with   severe dyspnea on exertion.  She had been known to have mitral regurgitation   since 2013.  She also had a history of mitral valve prolapse, but did not recall   having had a previous echo.  She had progressive dyspnea on exertion and   underwent a thoughtful and thorough evaluation, which included coronary   angiography as well as echocardiography.  The angiogram failed to demonstrate   any hemodynamically significant lesions.  The echo demonstrated severe mitral   regurgitation.  She now presents for mitral valve repair.    Procedure(s) (LRB): Mitral valve repair with triangular resection of the P2   scallop of the posterior leaflet of the mitral valve and 31 mm Medtronic ATS   Simulus band annuloplasty.       Hospital Course:  As described above, the patient underwent the above stated procedure. Please see the previously dictated operative report for complete details. Postoperatively, the patient was taken to the ICU where her vital signs where monitored and pain was kept under control. She was weaned from drips and extubated per the ICU per protocol. When it was felt she was hemodynamically stable, she was transferred to the Cardiac Step down unit for continued strengthening and ambulation. Her chest tubes were removed, at which time they had drained minimal amount. Pacer wires also removed before discharge. Remainder of her hospital course was uncomplicated. She remained in normal sinus rhythm. On postoperative day #3 the patient was desirous of discharge to home. At the time of  discharge, she was ambulating unassisted in the hallway; her pain was well controlled with oral analgesics and was tolerating diet without nausea or vomiting.     MOBILITY AND ACTIVITY: As tolerated. Patient my shower. No heavy lifting of greater than 5 pounds. No driving.     DIET: Cardiac diet with a 1500 ml fluid restriction.    WOUND CARE INSTRUCTIONS: Check for redness, swelling, and drainage around the incision or wound. Patient is to call for any obvious bleeding, drainage, and pus from the wound, unusual problems or difficulties or temperature of greater than 101 degrees.     Consults         Status Ordering Provider     Consult Case Management/Social Work  Once     Provider:  (Not yet assigned)    Acknowledged DONAL DOWNS     Consult to Endocrinology  Once     Provider:  (Not yet assigned)    Completed DONAL DOWNS     Inpatient consult to Cardiac Rehab  Once     Provider:  (Not yet assigned)    Completed DONAL DOWNS     Inpatient consult to Cardiac Rehab  Once     Provider:  (Not yet assigned)    Completed VITALY LOAIZA consult to dietary  Once     Provider:  (Not yet assigned)    Completed DONAL DOWNS          Significant Diagnostic Studies: Predischarge ECHO complete    Pending Diagnostic Studies:     Procedure Component Value Units Date/Time    2D echo with color flow doppler [[id number]]     Order Status:  Sent Lab Status:  No result     APTT [[id number]] Collected:  09/26/17 0124    Order Status:  Sent Lab Status:  In process Updated:  09/26/17 0125    Specimen:  Blood from Blood     Basic metabolic panel [[id number]] Collected:  09/26/17 0124    Order Status:  Sent Lab Status:  In process Updated:  09/26/17 0125    Specimen:  Blood from Blood     CBC auto differential [[id number]] Collected:  09/26/17 0124    Order Status:  Sent Lab Status:  In process Updated:  09/26/17 0125    Specimen:  Blood from Blood     Magnesium [[id number]] Collected:  09/26/17 0124     Order Status:  Sent Lab Status:  In process Updated:  09/26/17 0125    Specimen:  Blood from Blood     Phosphorus [[id number]] Collected:  09/26/17 0124    Order Status:  Sent Lab Status:  In process Updated:  09/26/17 0125    Specimen:  Blood from Blood     Protime-INR [[id number]] Collected:  09/26/17 0124    Order Status:  Sent Lab Status:  In process Updated:  09/26/17 0125    Specimen:  Blood from Blood         Final Active Diagnoses:    Diagnosis Date Noted POA    PRINCIPAL PROBLEM:  S/P mitral valve repair [Z98.890] 09/25/2017 Not Applicable    Hyperglycemia [R73.9] 09/25/2017 No      Problems Resolved During this Admission:    Diagnosis Date Noted Date Resolved POA    Encounter for weaning from ventilator [Z99.11]  09/26/2017 Not Applicable    Mitral regurgitation [I34.0] 09/25/2017 09/26/2017 Yes    Non-rheumatic mitral regurgitation [I34.0] 08/22/2017 09/28/2017 Yes      Discharged Condition: stable    Disposition: Home or Self Care    Follow Up: Follow up with Dr. Russell in 3 weeks. Prior to appointment patient will have a chest xray and EKG.   Follow-up Information     Philip Russell MD In 3 weeks.    Specialties:  Cardiothoracic Surgery, Transplant  Contact information:  [address] [place] [id number] [phone]                 Patient Instructions:   No discharge procedures on file.  Medications:  Reconciled Home Medications:   Current Discharge Medication List      START taking these medications    Details   aspirin 325 MG tablet Take 1 tablet (325 mg total) by mouth once daily.  Refills: 0      atorvastatin (LIPITOR) 40 MG tablet Take 1 tablet (40 mg total) by mouth every evening.  Qty: 90 tablet, Refills: 3      docusate sodium (COLACE) 100 MG capsule Take 1 capsule (100 mg total) by mouth daily as needed for Constipation.  Refills: 0      furosemide (LASIX) 20 MG tablet Take 1 tablet (20 mg total) by mouth 2 (two) times daily.  Qty: 60 tablet, Refills: 11    Comments: Take one  tablet by mouth twice daily for 7 days then decrease to once daily      metoprolol tartrate (LOPRESSOR) 50 MG tablet Take 1 tablet (50 mg total) by mouth every 12 (twelve) hours.  Qty: 60 tablet, Refills: 11      oxycodone (ROXICODONE) 5 MG immediate release tablet Take 1 tablet (5 mg total) by mouth every 3 (three) hours as needed.  Qty: 60 tablet, Refills: 0      potassium chloride (MICRO-K) 10 MEQ CpSR Take 2 capsules (20 mEq total) by mouth 2 (two) times daily.  Qty: 60 capsule, Refills: 11    Comments: Take two tablet  (20meq) by mouth twice daily for 7 days then decrease to once daily         CONTINUE these medications which have NOT CHANGED    Details   alprazolam (XANAX) 0.5 MG tablet       fluoxetine 10 MG Tab          STOP taking these medications       aspirin (ECOTRIN) 81 MG EC tablet Comments:   Reason for Stopping:         carvedilol (COREG) 25 MG tablet Comments:   Reason for Stopping:             At the time of discharge patient's vital signs were stable and afebrile. Pt was in a NSR. Patient was not placed on ACE at the time of discharge due to potential hypotension.     Josee Sherman NP  Cardiothoracic Surgery  [facility]

## 2017-09-28 NOTE — NURSING
Pt d/c home per MD order. Telemetry removed. Telemetry room notified. IV access removed and intact x1. VSS. No distress noted. No complaints noted at this time. Pt given and explained medication list and prescriptions. Pt verbalizes complete understanding of all discharge instructions and follow up care. Pt given printed AVS. Sign, copied, placed, and charted.  Family at bedside.  Awaiting escort pending echo. Will continue to monitor.

## 2017-09-28 NOTE — PHYSICIAN QUERY
PT Name: Karolina Jacobs  MR #: [id number]     Physician Query Form - Documentation Clarification      CDS/: Tanvi Orta RN, CCDS         Contact information: [email or web address]    This form is a permanent document in the medical record.     Query Date: September 28, 2017    By submitting this query, we are merely seeking further clarification of documentation. Please utilize your independent clinical judgment when addressing the question(s) below.    The Medical record reflects the following:    Supporting Clinical Findings Location in Medical Record     Phosphorus 2.2-->2.2-->2.0    K-phos di and mono-sod phos mono  250 mg tablet po, 2 tablets        9/25, 9/27, 9/28 lab    9/25 and 9/28 mar                                                                                      Doctor, Please specify diagnosis or diagnoses associated with above clinical findings.    Provider Use Only        ( x   )  Hypophosphatemia    (    )  Other__________                                                                                                                       [  ] Clinically undetermined

## 2017-09-28 NOTE — SUBJECTIVE & OBJECTIVE
"Interval HPI:   bg at goal, tolerated diet ~25%, no hypoglycemia, afebrile     BP (!) 117/57 (BP Location: Left arm, Patient Position: Sitting)   Pulse 107   Temp 99.9 °F (37.7 °C) (Oral)   Resp 18   Ht 5' 6" (1.676 m)   Wt 90.8 kg (200 lb 1.1 oz)   LMP 09/18/2017   SpO2 98%   Breastfeeding? No   BMI 32.29 kg/m²     Labs Reviewed and Include      Recent Labs  Lab 09/28/17  0448   *   CALCIUM 8.8   *   K 4.4   CO2 28      BUN 12   CREATININE 0.8     Lab Results   Component Value Date    WBC 11.58 09/28/2017    HGB 7.4 (L) 09/28/2017    HCT 24.8 (L) 09/28/2017    MCV 68 (L) 09/28/2017     09/28/2017     No results for input(s): TSH, FREET4 in the last 168 hours.  Lab Results   Component Value Date    HGBA1C 5.3 09/21/2017       Nutritional status:   Body mass index is 32.29 kg/m².  Lab Results   Component Value Date    ALBUMIN 3.6 09/21/2017     No results found for: PREALBUMIN    Estimated Creatinine Clearance: 95.5 mL/min (based on SCr of 0.8 mg/dL).    Accu-Checks  Recent Labs      09/26/17   0810  09/26/17   1014  09/26/17   1207  09/26/17   1421  09/26/17   1643  09/26/17   2053  09/26/17   2129  09/27/17   1153  09/27/17   1808  09/27/17 2055   POCTGLUCOSE  161*  148*  129*  131*  127*  96  107  127*  113*  139*       Current Medications and/or Treatments Impacting Glycemic Control  Immunotherapy:  Immunosuppressants     None        Steroids:   Hormones     None        Pressors:    Autonomic Drugs     None        Hyperglycemia/Diabetes Medications: Antihyperglycemics     None        "

## 2017-09-28 NOTE — PT/OT/SLP PROGRESS
Physical Therapy Progress Note       Karolina Jacobs  MRN: [id number]     PT start time: 0814  PT stop time: 0823  PT total time: 9 minutes (1 unit Therapeutic activity)      Pt is a 52 y.o. female s/p mitral valve repair. Pt found ambulating independently in hallway this AM with daughter.  Pt with no significant gait deficits identified. Therapist visited pt's room to discuss PT POC. Pt reported that she walked down to coffee shop in hospital this AM with no issues. Pt demonstrates high (I) with functional mobility, and endurance WFL. Pt has reached satisfactory goal achievement. Therapist educated pt and daughter on: sternal precautions and activity modifications, endurance training recommendations, therex for BLE strengthening, and safety with mobility.Pt verbalized understanding of education provided and expressed no further concerns. Pt is tentatively being discharged home today. PT orders discontinued 2/2 no further acute therapy needs.     Lor Hendrix, PT, DPT   9/28/2017  Pager: [phone]

## 2017-09-28 NOTE — HPI
Ms. Jacobs is a 52-year-old woman who initially presented with   severe dyspnea on exertion.  She had been known to have mitral regurgitation   since 2013.  She also had a history of mitral valve prolapse, but did not recall   having had a previous echo.  She had progressive dyspnea on exertion and   underwent a thoughtful and thorough evaluation, which included coronary   angiography as well as echocardiography.  The angiogram failed to demonstrate   any hemodynamically significant lesions.  The echo demonstrated severe mitral   regurgitation.  She now presents for mitral valve repair.

## 2017-09-28 NOTE — PT/OT/SLP PROGRESS
Occupational Therapy      Karolina L Reynaldo  MRN: [id number]    Patient not seen today secondary to pt off unit at ECHO. Pt to attempt at later date.     GASTON Coronado  9/28/2017

## 2017-09-28 NOTE — PT/OT/SLP DISCHARGE
Physical Therapy Discharge Summary    Karolina Jacobs  MRN: [id number]   S/P mitral valve repair   Patient Discharged from acute Physical Therapy on 17  Please refer to prior PT noted date on 17 for functional status.     Assessment:   Patient has met all goals; skilled therapy no longer indicated.     GOALS:    Physical Therapy Goals     Not on file          Multidisciplinary Problems (Resolved)        Problem: Physical Therapy Goal    Goal Priority Disciplines Outcome Goal Variances Interventions   Physical Therapy Goal   (Resolved)     PT/OT, PT Outcome(s) achieved     Description:  Goals to be met by: 10/3/17    Patient will increase functional independence with mobility by performin. Supine to sit with [place] - not met  2. Sit to stand transfer with [place] -not met  3. Gait  x 220 feet with [place] - not met                  Reasons for Discontinuation of Therapy Services  Satisfactory goal achievement.      Plan:  Recommending patient discharged to: Home no PT services needed.    Lor Hendrix PT, DPT   2017  Pager: [phone]

## 2017-09-28 NOTE — PROGRESS NOTES
"[facility]  Endocrinology  Progress Note    Admit Date: 9/25/2017     Reason for Consult: Management of Hyperglycemia     HPI: Patient is a 52 y.o. female with severe mitral regurgitation. She was symptomatic with dyspnea on exertion. S/p mitral valve repair with stress induced hyperglycemia. Endocrine consulted for bg management.          Interval HPI:   bg at goal, tolerated diet ~25%, no hypoglycemia, afebrile     BP (!) 117/57 (BP Location: Left arm, Patient Position: Sitting)   Pulse 107   Temp 99.9 °F (37.7 °C) (Oral)   Resp 18   Ht 5' 6" (1.676 m)   Wt 90.8 kg (200 lb 1.1 oz)   LMP 09/18/2017   SpO2 98%   Breastfeeding? No   BMI 32.29 kg/m²       Labs Reviewed and Include      Recent Labs  Lab 09/28/17  0448   *   CALCIUM 8.8   *   K 4.4   CO2 28      BUN 12   CREATININE 0.8     Lab Results   Component Value Date    WBC 11.58 09/28/2017    HGB 7.4 (L) 09/28/2017    HCT 24.8 (L) 09/28/2017    MCV 68 (L) 09/28/2017     09/28/2017     No results for input(s): TSH, FREET4 in the last 168 hours.  Lab Results   Component Value Date    HGBA1C 5.3 09/21/2017       Nutritional status:   Body mass index is 32.29 kg/m².  Lab Results   Component Value Date    ALBUMIN 3.6 09/21/2017     No results found for: PREALBUMIN    Estimated Creatinine Clearance: 95.5 mL/min (based on SCr of 0.8 mg/dL).    Accu-Checks  Recent Labs      09/26/17   0810  09/26/17   1014  09/26/17   1207  09/26/17   1421  09/26/17   1643  09/26/17   2053  09/26/17   2129  09/27/17   1153  09/27/17   1808  09/27/17 2055   POCTGLUCOSE  161*  148*  129*  131*  127*  96  107  127*  113*  139*       Current Medications and/or Treatments Impacting Glycemic Control  Immunotherapy:  Immunosuppressants     None        Steroids:   Hormones     None        Pressors:    Autonomic Drugs     None        Hyperglycemia/Diabetes Medications: Antihyperglycemics     None          ASSESSMENT and PLAN    * S/P " mitral valve repair    Per CTS          Hyperglycemia    Resolving, will sign off  Please re-consult for changes               Kari May, EDI, NP  Endocrinology  [facility]

## 2017-10-26 ENCOUNTER — HOSPITAL ENCOUNTER (OUTPATIENT)
Dept: RADIOLOGY | Facility: HOSPITAL | Age: 52
Discharge: HOME | End: 2017-10-26
Attending: THORACIC SURGERY (CARDIOTHORACIC VASCULAR SURGERY)
Payer: COMMERCIAL

## 2017-10-26 ENCOUNTER — HOSPITAL ENCOUNTER (OUTPATIENT)
Dept: CARDIOLOGY | Facility: CLINIC | Age: 52
Discharge: HOME | End: 2017-10-26
Payer: COMMERCIAL

## 2017-10-26 DIAGNOSIS — Z98.890 S/P MVR (MITRAL VALVE REPAIR): ICD-10-CM

## 2017-10-26 PROCEDURE — 71020 XR CHEST PA AND LATERAL: CPT | Mod: TC

## 2017-10-26 PROCEDURE — 93000 ELECTROCARDIOGRAM COMPLETE: CPT | Mod: S$GLB,,, | Performed by: INTERNAL MEDICINE

## 2017-10-26 PROCEDURE — 71020 XR CHEST PA AND LATERAL: CPT | Mod: 26,,, | Performed by: RADIOLOGY

## 2017-11-02 ENCOUNTER — OFFICE VISIT (OUTPATIENT)
Dept: CARDIOTHORACIC SURGERY | Facility: CLINIC | Age: 52
End: 2017-11-02
Payer: COMMERCIAL

## 2017-11-02 VITALS
BODY MASS INDEX: 31.66 KG/M2 | TEMPERATURE: 100 F | SYSTOLIC BLOOD PRESSURE: 128 MMHG | HEIGHT: 66 IN | DIASTOLIC BLOOD PRESSURE: 66 MMHG | OXYGEN SATURATION: 98 % | WEIGHT: 197 LBS | HEART RATE: 77 BPM

## 2017-11-02 DIAGNOSIS — Z98.890 S/P MITRAL VALVE REPAIR: ICD-10-CM

## 2017-11-02 DIAGNOSIS — Z98.890 S/P AORTIC VALVE REPAIR: Primary | ICD-10-CM

## 2017-11-02 PROCEDURE — 99024 POSTOP FOLLOW-UP VISIT: CPT | Mod: S$GLB,,, | Performed by: THORACIC SURGERY (CARDIOTHORACIC VASCULAR SURGERY)

## 2017-11-02 PROCEDURE — 99999 PR PBB SHADOW E&M-EST. PATIENT-LVL III: CPT | Mod: PBBFAC,,, | Performed by: THORACIC SURGERY (CARDIOTHORACIC VASCULAR SURGERY)

## 2017-11-02 NOTE — PROGRESS NOTES
Patient seen and examined. Patient is progressively increasing activity. No significant complaints.       Sternum: stable, incision CDI  Chest xray: Acceptable post op chest  EKG: NSR     Assessment:  REPAIR VALVE-MITRAL with triangular resection of P2 and 31 mm Medtronic ATS Simulus band annuloplasty          Plan:  Can begin driving as long as he has power steering  Can begin cardiac rehab in the next couple of weeks  We will refer to cardiology to assume care- Dr. Carmen isaac        No scheduled appointment, RTC prn    CTS Attending Note:    I have personally taken the history and examined this patient and agree with the NP's note as stated above.

## 2017-11-02 NOTE — LETTER
November 2, 2017        Steven Morales MD  [address] [place] [address]             [facility]  [address]  [facility] [address]  Phone: [phone]   Patient: Karolina Jacobs   MR Number: [id number]   YOB: 1965   Date of Visit: 11/2/2017       Dear Dr. Morales:    I had the pleasure of seeing your patient Mrs. Irene Jacobs in clinic today.        Sincerely,      Philip Russell MD           CC  Bryant Young MD

## 2017-11-07 ENCOUNTER — DOCUMENTATION ONLY (OUTPATIENT)
Dept: CARDIOTHORACIC SURGERY | Facility: CLINIC | Age: 52
End: 2017-11-07

## 2017-11-07 DIAGNOSIS — Z98.890 S/P MVR (MITRAL VALVE REPAIR): Primary | ICD-10-CM

## 2018-02-27 NOTE — PT/OT/SLP DISCHARGE
Occupational Therapy Discharge Summary    Karolina Jacobs  MRN: [id number]   Principal Problem: S/P mitral valve repair      Patient Discharged from acute Occupational Therapy on 9/29/17.      Assessment:      Patient was discharged unexpectedly.  Information required to complete an accurate discharge summary is unknown.  Refer to therapy initial evaluation and last progress note for initial and most recent functional status and goal achievement.  Recommendations made may be found in medical record.    Objective:     GOALS:    Occupational Therapy Goals     Not on file          Multidisciplinary Problems (Resolved)        Problem: Occupational Therapy Goal    Goal Priority Disciplines Outcome Interventions   Occupational Therapy Goal   (Resolved)     OT, PT/OT Outcome(s) achieved    Description:  Goals to be met by: 10/06/17     Patient will increase functional independence with ADLs by performing:    Feeding with [place].  UE Dressing with Supervision.  LE Dressing with Supervision.  Grooming while standing at sink with Supervision.  Toileting from toilet with Supervision for hygiene and clothing management.   Toilet transfer to toilet with Supervision.                      Reasons for Discontinuation of Therapy Services  Transfer to alternate level of care.      Plan:     Patient Discharged to: Home no OT services needed    GASTON Patricio  2/27/2018

## 2020-02-21 ENCOUNTER — TELEPHONE (OUTPATIENT)
Dept: FAMILY MEDICINE | Facility: CLINIC | Age: 55
End: 2020-02-21

## 2020-02-21 RX ORDER — LISINOPRIL 10 MG/1
10 TABLET ORAL DAILY
COMMUNITY
Start: 2020-02-01 | End: 2020-05-18 | Stop reason: SDUPTHER

## 2020-02-21 NOTE — LETTER
AUTHORIZATION FOR RELEASE OF   CONFIDENTIAL INFORMATION    Dear Dr. [facility],    We are seeing Karolina Jacobs, date of birth 1965, in the clinic at [facility]. Karolina Jacobs has an outstanding lab/procedure at the time we reviewed her chart. In order to help keep her health information updated, she has authorized us to request the following medical record(s):        ( x )  MAMMOGRAM                                      ( x )  COLONOSCOPY      ( x )  PAP SMEAR                                          (x  )  OUTSIDE LAB RESULTS     ( x )  DEXA SCAN                                          ( x )  EYE EXAM            (x )  FOOT EXAM                                          ( x )  ENTIRE RECORD     (x)  OUTSIDE IMMUNIZATIONS                 (  )  _______________         Please fax records to [facility], Ashley G. Ingolia, MD [phone]     If you have any questions, please contact Lindsey at [phone].           Patient Name: Karolina Jacobs  : 1965  Patient Phone #: [phone]

## 2020-02-21 NOTE — TELEPHONE ENCOUNTER
GOAL of current visit: Est care   Previous PCP: Bryant oYung   Specialists: Francoise Coleman Fowlkes, Mellin  Recent lab work: 10/10/19  Recent imagin19  Colonoscopy History:   Pharmacy:   Medications: Xanax PRN, Lisinopril, and  Metoprolol     Health Maintenance Due   Topic Date Due    Lipid Panel  1965    TETANUS VACCINE  1985    Aspirin/Antiplatelet Therapy  1985    Pap Smear with HPV Cotest  1988    Mammogram  2007    Colonoscopy  2017

## 2020-02-25 PROBLEM — R06.02 SHORTNESS OF BREATH: Status: ACTIVE | Noted: 2017-08-16

## 2020-02-25 PROBLEM — G47.33 OBSTRUCTIVE SLEEP APNEA: Status: ACTIVE | Noted: 2019-04-17

## 2020-02-25 PROBLEM — E66.09 CLASS 1 OBESITY DUE TO EXCESS CALORIES WITHOUT SERIOUS COMORBIDITY WITH BODY MASS INDEX (BMI) OF 31.0 TO 31.9 IN ADULT: Status: ACTIVE | Noted: 2018-02-28

## 2020-02-25 PROBLEM — I10 ESSENTIAL HYPERTENSION: Status: ACTIVE | Noted: 2017-08-11

## 2020-02-25 PROBLEM — I34.0 NON-RHEUMATIC MITRAL REGURGITATION: Status: ACTIVE | Noted: 2017-07-10

## 2020-02-27 ENCOUNTER — OFFICE VISIT (OUTPATIENT)
Dept: FAMILY MEDICINE | Facility: CLINIC | Age: 55
End: 2020-02-27
Payer: COMMERCIAL

## 2020-02-27 VITALS
BODY MASS INDEX: 28.28 KG/M2 | HEART RATE: 78 BPM | HEIGHT: 66 IN | TEMPERATURE: 98 F | SYSTOLIC BLOOD PRESSURE: 114 MMHG | WEIGHT: 176 LBS | DIASTOLIC BLOOD PRESSURE: 66 MMHG

## 2020-02-27 DIAGNOSIS — R53.83 LOW ENERGY: ICD-10-CM

## 2020-02-27 DIAGNOSIS — Z13.21 ENCOUNTER FOR VITAMIN DEFICIENCY SCREENING: ICD-10-CM

## 2020-02-27 DIAGNOSIS — E66.3 OVERWEIGHT (BMI 25.0-29.9): Primary | ICD-10-CM

## 2020-02-27 DIAGNOSIS — F41.9 ANXIETY: ICD-10-CM

## 2020-02-27 DIAGNOSIS — R41.840 ATTENTION DEFICIT: ICD-10-CM

## 2020-02-27 DIAGNOSIS — N95.1 PERIMENOPAUSAL: ICD-10-CM

## 2020-02-27 DIAGNOSIS — D25.9 UTERINE LEIOMYOMA, UNSPECIFIED LOCATION: ICD-10-CM

## 2020-02-27 DIAGNOSIS — Z28.21 INFLUENZA VACCINATION DECLINED: ICD-10-CM

## 2020-02-27 DIAGNOSIS — Z23 NEED FOR DIPHTHERIA-TETANUS-PERTUSSIS (TDAP) VACCINE: ICD-10-CM

## 2020-02-27 DIAGNOSIS — D50.0 IRON DEFICIENCY ANEMIA DUE TO CHRONIC BLOOD LOSS: ICD-10-CM

## 2020-02-27 DIAGNOSIS — Z13.89 SCREENING FOR HEMATURIA OR PROTEINURIA: ICD-10-CM

## 2020-02-27 DIAGNOSIS — Z76.89 ENCOUNTER TO ESTABLISH CARE: ICD-10-CM

## 2020-02-27 DIAGNOSIS — G47.33 OBSTRUCTIVE SLEEP APNEA: Chronic | ICD-10-CM

## 2020-02-27 DIAGNOSIS — Z13.29 SCREENING FOR THYROID DISORDER: ICD-10-CM

## 2020-02-27 DIAGNOSIS — Z13.220 SCREENING FOR LIPID DISORDERS: ICD-10-CM

## 2020-02-27 DIAGNOSIS — Z11.4 SCREENING FOR HIV (HUMAN IMMUNODEFICIENCY VIRUS): ICD-10-CM

## 2020-02-27 DIAGNOSIS — I10 ESSENTIAL HYPERTENSION: Chronic | ICD-10-CM

## 2020-02-27 DIAGNOSIS — Z13.0 SCREENING FOR DEFICIENCY ANEMIA: ICD-10-CM

## 2020-02-27 DIAGNOSIS — Z13.1 SCREENING FOR DIABETES MELLITUS (DM): ICD-10-CM

## 2020-02-27 PROBLEM — Z98.890 S/P MITRAL VALVE REPAIR: Chronic | Status: ACTIVE | Noted: 2017-09-25

## 2020-02-27 PROCEDURE — 90715 TDAP VACCINE 7 YRS/> IM: CPT | Mod: S$GLB,,, | Performed by: INTERNAL MEDICINE

## 2020-02-27 PROCEDURE — 90471 IMMUNIZATION ADMIN: CPT | Mod: S$GLB,,, | Performed by: INTERNAL MEDICINE

## 2020-02-27 PROCEDURE — 99204 PR OFFICE/OUTPT VISIT, NEW, LEVL IV, 45-59 MIN: ICD-10-PCS | Mod: 25,S$GLB,, | Performed by: INTERNAL MEDICINE

## 2020-02-27 PROCEDURE — 90715 TDAP VACCINE GREATER THAN OR EQUAL TO 7YO IM: ICD-10-PCS | Mod: S$GLB,,, | Performed by: INTERNAL MEDICINE

## 2020-02-27 PROCEDURE — 99999 PR PBB SHADOW E&M-EST. PATIENT-LVL III: CPT | Mod: PBBFAC,,, | Performed by: INTERNAL MEDICINE

## 2020-02-27 PROCEDURE — 90471 TDAP VACCINE GREATER THAN OR EQUAL TO 7YO IM: ICD-10-PCS | Mod: S$GLB,,, | Performed by: INTERNAL MEDICINE

## 2020-02-27 PROCEDURE — 99999 PR PBB SHADOW E&M-EST. PATIENT-LVL III: ICD-10-PCS | Mod: PBBFAC,,, | Performed by: INTERNAL MEDICINE

## 2020-02-27 PROCEDURE — 99204 OFFICE O/P NEW MOD 45 MIN: CPT | Mod: 25,S$GLB,, | Performed by: INTERNAL MEDICINE

## 2020-02-27 RX ORDER — BUPROPION HYDROCHLORIDE 150 MG/1
150 TABLET ORAL DAILY
Qty: 30 TABLET | Refills: 0 | Status: SHIPPED | OUTPATIENT
Start: 2020-02-27 | End: 2020-06-01

## 2020-02-27 RX ORDER — METOPROLOL TARTRATE 50 MG/1
25 TABLET ORAL 2 TIMES DAILY
Qty: 90 TABLET | Refills: 1 | Status: SHIPPED | OUTPATIENT
Start: 2020-02-27 | End: 2020-03-13 | Stop reason: SDUPTHER

## 2020-02-27 RX ORDER — ASPIRIN 81 MG/1
162 TABLET ORAL DAILY
COMMUNITY
End: 2020-02-27 | Stop reason: SDUPTHER

## 2020-02-27 RX ORDER — ASPIRIN 81 MG/1
81 TABLET ORAL DAILY
Start: 2020-02-27 | End: 2021-02-22

## 2020-02-27 RX ORDER — ALPRAZOLAM 0.25 MG/1
0.25 TABLET ORAL 2 TIMES DAILY PRN
Qty: 30 TABLET | Refills: 0 | Status: SHIPPED | OUTPATIENT
Start: 2020-02-27 | End: 2021-01-29

## 2020-02-27 NOTE — PROGRESS NOTES
Subjective:      Patient ID: Karolina Jacobs is a 54 y.o. female.    Chief Complaint: Hypertension (establish care)    HPI     54F here to establish care and discuss HTN and attention deficit.     She started having more issues with attention and focusing since her 's sudden death a few years ago. She then had MVR shortly thereafter (2 years ago).     Hypertension  -Doesn't monitor at home.   -She sometimes skips doses.   -BP today 114/66, HR 78  -Feels good from that standpoint with no cp, sob, vision chnages    Anxiety  -well controlled. Very rarely uses xanax 0.5 (last filled 6/2018 #30)  -she is nervous about panic episodes after undergoing the traumatic events above.  We discussed okay for refill at reduced dose of 0.25 number 30.    Low energy, attention issues  -history of SARAH. Still having heavy menstrual cycles with leiomyoma.   -she feels that she has trouble focusing and has good energy level except for the times around her cycle.  She has read that people experience memory issues after having valve replacements.  She has sleep apnea, but did not get the machine or equipment.  She feels rested in the morning, but does question whether this is contributing.  We discussed getting the records and finding out about the suggestions from the sleep medicine physician.  Prior to the last few months she did not struggle with attention issues.  She is in a healthy relationship.  She does well at work.  She is very stressed with her daughter who is pregnant with her 1st child and living with her, her mother living with her, and and son and daughter-in-law nearby.  No depression.  We discussed ruling out metabolic etiologies and doing a trial of Wellbutrin  mg daily.  Discussed that if unsuccessful would refer to Psychology for evaluation and if positive referral to Psychiatry as she is high risk for cardiac complications.    tdap given today.  We will discuss shingles vaccination at next visit.  No  recent laboratory data.  She is also taking 2 baby aspirins daily.  Discussed reducing to daily as this could be contributing to low blood counts.  We also discussed reducing the metoprolol to half twice daily as she is not compliant and could be leading to fatigue.    Review of patient's allergies indicates:   Allergen Reactions    Sulfa (sulfonamide antibiotics) Other (See Comments)     Unknown, pt was a child when she had reaction       Current Outpatient Medications:     ALPRAZolam (XANAX) 0.25 MG tablet, Take 1 tablet (0.25 mg total) by mouth 2 (two) times daily as needed for Anxiety., Disp: 30 tablet, Rfl: 0    aspirin (ECOTRIN) 81 MG EC tablet, Take 1 tablet (81 mg total) by mouth once daily., Disp: , Rfl:     lisinopril 10 MG tablet, Take 10 mg by mouth once daily., Disp: , Rfl:     metoprolol tartrate (LOPRESSOR) 50 MG tablet, Take 0.5 tablets (25 mg total) by mouth 2 (two) times daily., Disp: 90 tablet, Rfl: 1    buPROPion (WELLBUTRIN XL) 150 MG TB24 tablet, Take 1 tablet (150 mg total) by mouth once daily., Disp: 30 tablet, Rfl: 0    Past Medical History:   Diagnosis Date    Essential hypertension 08/11/2017    Iron deficiency anemia due to chronic blood loss 8/22/2017    Non-rheumatic mitral regurgitation 07/10/2017    Obstructive sleep apnea 04/17/2019    S/P mitral valve repair 9/25/2017     Past Surgical History:   Procedure Laterality Date    MITRAL VALVE REPAIR  09/2017    TUBAL LIGATION  1990's     Family History   Problem Relation Age of Onset    Heart disease Father     Colon cancer Sister     Breast cancer Sister      Social History     Socioeconomic History    Marital status:      Spouse name: Not on file    Number of children: Not on file    Years of education: Not on file    Highest education level: Not on file   Occupational History    Not on file   Social Needs    Financial resource strain: Not on file    Food insecurity:     Worry: Not on file     Inability:  Not on file    Transportation needs:     Medical: Not on file     Non-medical: Not on file   Tobacco Use    Smoking status: Never Smoker    Smokeless tobacco: Never Used   Substance and Sexual Activity    Alcohol use: No    Drug use: Never    Sexual activity: Not Currently     Partners: Male     Birth control/protection: Surgical   Lifestyle    Physical activity:     Days per week: Not on file     Minutes per session: Not on file    Stress: Not on file   Relationships    Social connections:     Talks on phone: Not on file     Gets together: Not on file     Attends Hinduism service: Not on file     Active member of club or organization: Not on file     Attends meetings of clubs or organizations: Not on file     Relationship status: Not on file   Other Topics Concern    Not on file   Social History Narrative    Not on file      Review of Systems   Constitutional: Positive for fatigue. Negative for chills and fever.   HENT: Negative for congestion.    Eyes: Negative for visual disturbance.   Respiratory: Negative for cough, shortness of breath and wheezing.    Cardiovascular: Negative for chest pain and palpitations.   Gastrointestinal: Negative for abdominal pain and nausea.   Endocrine: Negative for cold intolerance and heat intolerance.   Genitourinary: Positive for menstrual problem. Negative for decreased urine volume, dyspareunia, dysuria, frequency, urgency, vaginal bleeding, vaginal discharge and vaginal pain.   Musculoskeletal: Negative for back pain and myalgias.   Skin: Negative for rash.   Allergic/Immunologic: Negative for environmental allergies.   Neurological: Negative for dizziness, syncope, weakness and headaches.   Hematological: Does not bruise/bleed easily.   Psychiatric/Behavioral: Negative for decreased concentration, dysphoric mood and sleep disturbance. The patient is not nervous/anxious.          Objective:     Body mass index is 28.41 kg/m².  /66   Pulse 78   Temp 98.2 °F  "(36.8 °C)   Ht 5' 6" (1.676 m)   Wt 79.8 kg (176 lb)   BMI 28.41 kg/m²       Physical Exam   Constitutional: She is oriented to person, place, and time. She appears well-developed and well-nourished. No distress.   HENT:   Head: Normocephalic.   Mouth/Throat: Oropharynx is clear and moist.   Eyes: Conjunctivae are normal.   Cardiovascular: Normal rate, regular rhythm, normal heart sounds and intact distal pulses.   No murmur heard.  Well healed sternal scar from MVR   Pulmonary/Chest: Effort normal and breath sounds normal.   Abdominal: Soft. Bowel sounds are normal.   Musculoskeletal: She exhibits no tenderness.   Lymphadenopathy:     She has no cervical adenopathy.   Neurological: She is alert and oriented to person, place, and time. No sensory deficit.   Skin: Skin is warm and dry. Capillary refill takes 2 to 3 seconds. She is not diaphoretic.   Psychiatric: She has a normal mood and affect. Her behavior is normal.   Nursing note and vitals reviewed.      No visits with results within 6 Month(s) from this visit.   Latest known visit with results is:   No results displayed because visit has over 200 results.        No results found in the last 24 hours.   Assessment:       ICD-10-CM ICD-9-CM   1. Overweight (BMI 25.0-29.9) E66.3 278.02   2. Need for diphtheria-tetanus-pertussis (Tdap) vaccine Z23 V06.1   3. Influenza vaccination declined Z28.21 V64.06   4. Encounter to establish care Z76.89 V65.8   5. Essential hypertension I10 401.9   6. Anxiety F41.9 300.00   7. Attention deficit R41.840 799.51   8. Iron deficiency anemia due to chronic blood loss D50.0 280.0   9. Obstructive sleep apnea G47.33 327.23   10. Encounter for vitamin deficiency screening Z13.21 V77.99   11. Perimenopausal N95.1 627.2   12. Uterine leiomyoma, unspecified location D25.9 218.9   13. Screening for deficiency anemia Z13.0 V78.1   14. Screening for thyroid disorder Z13.29 V77.0   15. Screening for HIV (human immunodeficiency virus) " Z11.4 V73.89   16. Screening for lipid disorders Z13.220 V77.91   17. Screening for diabetes mellitus (DM) Z13.1 V77.1   18. Screening for hematuria or proteinuria Z13.89 V82.9   19. Low energy R53.83 780.79       Plan:     #NEGRITA  -has lost weight. Study done at [place]. Get records. Does not have machine at home.     #Attention issues, fatigue  -likely metabolic related to SARAH vs. Vitamin deficiency.   -also discussed could be 2/2 low BP & HR  -reduce toprol xl to 25 mg BID (1/2 50 mg BID). Monitor HR and BP  -screening labs  -f/up of sleep study results  -trial of wellbutrin xl 150 mg daily- counseled on side effects. Reach out after one month re: response    #Anxiety  -Refill xanax PRN 6/6/18. Reduce to 0.25 mg. #30    #Microcytic anemia  -10/10/19: h/h/ 11.6/37.7, MCV 75, RDW 16.8    #MR S/p MVR  -TTE 3/13/18: EF 60-65% with normal DD  -reduce asa 162 mg daily to 81 mg daily    #HTN  -well controlled. 114/66 today  -Continue Lisinopril 10 mg daily & Metoprolol Tartrate 50 mg BID --> reduce to 25 bid    #Overweight-BMI 28.41  -continue healthy diet and exercise     #HCM  -Mammogram 7/24/19: birads 1.   -Pap. 5/30/19. Dr. Mcclellan. Negative for malignancy & HPV.   -Colonoscopy 3/20/17. Dr. Richey. [place]. Normal. Repeat in 5 years due to family history.   -TDAP today , 2/27/20    Get records from dr. Young and sleep study    Return in 6 months.     Immunization History   Administered Date(s) Administered    Tdap 02/27/2020       Elizabeth Burns M.D.    Orders Placed This Encounter   Procedures    Tdap Vaccine    CBC auto differential    Comprehensive metabolic panel    Hemoglobin A1c    Lipid panel    TSH    Magnesium    Vitamin D    Iron and TIBC    Ferritin    Urinalysis    Vitamin B12    Methylmalonic Acid, Serum    HIV 1/2 Ag/Ab (4th Gen)      Medications Ordered This Encounter   Medications    ALPRAZolam (XANAX) 0.25 MG tablet     Sig: Take 1 tablet (0.25 mg total) by mouth 2 (two)  times daily as needed for Anxiety.     Dispense:  30 tablet     Refill:  0    aspirin (ECOTRIN) 81 MG EC tablet     Sig: Take 1 tablet (81 mg total) by mouth once daily.    buPROPion (WELLBUTRIN XL) 150 MG TB24 tablet     Sig: Take 1 tablet (150 mg total) by mouth once daily.     Dispense:  30 tablet     Refill:  0    metoprolol tartrate (LOPRESSOR) 50 MG tablet     Sig: Take 0.5 tablets (25 mg total) by mouth 2 (two) times daily.     Dispense:  90 tablet     Refill:  1

## 2020-02-28 ENCOUNTER — PATIENT OUTREACH (OUTPATIENT)
Dept: ADMINISTRATIVE | Facility: HOSPITAL | Age: 55
End: 2020-02-28

## 2020-03-02 ENCOUNTER — PATIENT OUTREACH (OUTPATIENT)
Dept: ADMINISTRATIVE | Facility: HOSPITAL | Age: 55
End: 2020-03-02

## 2020-03-13 ENCOUNTER — OFFICE VISIT (OUTPATIENT)
Dept: FAMILY MEDICINE | Facility: CLINIC | Age: 55
End: 2020-03-13
Payer: COMMERCIAL

## 2020-03-13 ENCOUNTER — LAB VISIT (OUTPATIENT)
Dept: LAB | Facility: HOSPITAL | Age: 55
End: 2020-03-13
Attending: INTERNAL MEDICINE
Payer: COMMERCIAL

## 2020-03-13 VITALS
BODY MASS INDEX: 27.8 KG/M2 | WEIGHT: 173 LBS | HEART RATE: 67 BPM | SYSTOLIC BLOOD PRESSURE: 122 MMHG | RESPIRATION RATE: 16 BRPM | HEIGHT: 66 IN | TEMPERATURE: 98 F | DIASTOLIC BLOOD PRESSURE: 77 MMHG | OXYGEN SATURATION: 99 %

## 2020-03-13 DIAGNOSIS — Z11.4 SCREENING FOR HIV (HUMAN IMMUNODEFICIENCY VIRUS): ICD-10-CM

## 2020-03-13 DIAGNOSIS — R41.840 ATTENTION DEFICIT: ICD-10-CM

## 2020-03-13 DIAGNOSIS — Z13.89 SCREENING FOR HEMATURIA OR PROTEINURIA: ICD-10-CM

## 2020-03-13 DIAGNOSIS — Z13.1 SCREENING FOR DIABETES MELLITUS (DM): ICD-10-CM

## 2020-03-13 DIAGNOSIS — G47.33 OBSTRUCTIVE SLEEP APNEA: Chronic | ICD-10-CM

## 2020-03-13 DIAGNOSIS — Z13.29 SCREENING FOR THYROID DISORDER: ICD-10-CM

## 2020-03-13 DIAGNOSIS — Z28.21 INFLUENZA VACCINATION DECLINED: ICD-10-CM

## 2020-03-13 DIAGNOSIS — Z13.0 SCREENING FOR DEFICIENCY ANEMIA: ICD-10-CM

## 2020-03-13 DIAGNOSIS — I10 ESSENTIAL HYPERTENSION: Chronic | ICD-10-CM

## 2020-03-13 DIAGNOSIS — D25.9 UTERINE LEIOMYOMA, UNSPECIFIED LOCATION: ICD-10-CM

## 2020-03-13 DIAGNOSIS — Z76.89 ENCOUNTER TO ESTABLISH CARE: ICD-10-CM

## 2020-03-13 DIAGNOSIS — R53.83 LOW ENERGY: ICD-10-CM

## 2020-03-13 DIAGNOSIS — D50.0 IRON DEFICIENCY ANEMIA DUE TO CHRONIC BLOOD LOSS: ICD-10-CM

## 2020-03-13 DIAGNOSIS — Z13.220 SCREENING FOR LIPID DISORDERS: ICD-10-CM

## 2020-03-13 DIAGNOSIS — N95.1 PERIMENOPAUSAL: ICD-10-CM

## 2020-03-13 DIAGNOSIS — E66.3 OVERWEIGHT (BMI 25.0-29.9): ICD-10-CM

## 2020-03-13 DIAGNOSIS — Z23 NEED FOR DIPHTHERIA-TETANUS-PERTUSSIS (TDAP) VACCINE: ICD-10-CM

## 2020-03-13 DIAGNOSIS — Z13.21 ENCOUNTER FOR VITAMIN DEFICIENCY SCREENING: ICD-10-CM

## 2020-03-13 DIAGNOSIS — F41.9 ANXIETY: ICD-10-CM

## 2020-03-13 DIAGNOSIS — R21 RASH: Primary | ICD-10-CM

## 2020-03-13 LAB
25(OH)D3+25(OH)D2 SERPL-MCNC: 29 NG/ML (ref 30–96)
ALBUMIN SERPL BCP-MCNC: 4.3 G/DL (ref 3.5–5.2)
ALP SERPL-CCNC: 75 U/L (ref 55–135)
ALT SERPL W/O P-5'-P-CCNC: 16 U/L (ref 10–44)
ANION GAP SERPL CALC-SCNC: 8 MMOL/L (ref 8–16)
AST SERPL-CCNC: 19 U/L (ref 10–40)
BASOPHILS # BLD AUTO: 0.09 K/UL (ref 0–0.2)
BASOPHILS NFR BLD: 1.5 % (ref 0–1.9)
BILIRUB SERPL-MCNC: 0.6 MG/DL (ref 0.1–1)
BUN SERPL-MCNC: 11 MG/DL (ref 6–20)
CALCIUM SERPL-MCNC: 9.8 MG/DL (ref 8.7–10.5)
CHLORIDE SERPL-SCNC: 104 MMOL/L (ref 95–110)
CHOLEST SERPL-MCNC: 171 MG/DL (ref 120–199)
CHOLEST/HDLC SERPL: 3.4 {RATIO} (ref 2–5)
CO2 SERPL-SCNC: 28 MMOL/L (ref 23–29)
CREAT SERPL-MCNC: 0.9 MG/DL (ref 0.5–1.4)
DIFFERENTIAL METHOD: ABNORMAL
EOSINOPHIL # BLD AUTO: 0.4 K/UL (ref 0–0.5)
EOSINOPHIL NFR BLD: 6.5 % (ref 0–8)
ERYTHROCYTE [DISTWIDTH] IN BLOOD BY AUTOMATED COUNT: 16.8 % (ref 11.5–14.5)
EST. GFR  (AFRICAN AMERICAN): >60 ML/MIN/1.73 M^2
EST. GFR  (NON AFRICAN AMERICAN): >60 ML/MIN/1.73 M^2
ESTIMATED AVG GLUCOSE: 105 MG/DL (ref 68–131)
FERRITIN SERPL-MCNC: 4 NG/ML (ref 20–300)
GLUCOSE SERPL-MCNC: 96 MG/DL (ref 70–110)
HBA1C MFR BLD HPLC: 5.3 % (ref 4–5.6)
HCT VFR BLD AUTO: 45.1 % (ref 37–48.5)
HDLC SERPL-MCNC: 50 MG/DL (ref 40–75)
HDLC SERPL: 29.2 % (ref 20–50)
HGB BLD-MCNC: 12.8 G/DL (ref 12–16)
IMM GRANULOCYTES # BLD AUTO: 0.01 K/UL (ref 0–0.04)
IMM GRANULOCYTES NFR BLD AUTO: 0.2 % (ref 0–0.5)
IRON SERPL-MCNC: 42 UG/DL (ref 30–160)
LDLC SERPL CALC-MCNC: 99.4 MG/DL (ref 63–159)
LYMPHOCYTES # BLD AUTO: 1.8 K/UL (ref 1–4.8)
LYMPHOCYTES NFR BLD: 29.4 % (ref 18–48)
MAGNESIUM SERPL-MCNC: 2 MG/DL (ref 1.6–2.6)
MCH RBC QN AUTO: 23.6 PG (ref 27–31)
MCHC RBC AUTO-ENTMCNC: 28.4 G/DL (ref 32–36)
MCV RBC AUTO: 83 FL (ref 82–98)
MONOCYTES # BLD AUTO: 0.4 K/UL (ref 0.3–1)
MONOCYTES NFR BLD: 6.3 % (ref 4–15)
NEUTROPHILS # BLD AUTO: 3.5 K/UL (ref 1.8–7.7)
NEUTROPHILS NFR BLD: 56.1 % (ref 38–73)
NONHDLC SERPL-MCNC: 121 MG/DL
NRBC BLD-RTO: 0 /100 WBC
PLATELET # BLD AUTO: 229 K/UL (ref 150–350)
PMV BLD AUTO: 12.6 FL (ref 9.2–12.9)
POTASSIUM SERPL-SCNC: 4.1 MMOL/L (ref 3.5–5.1)
PROT SERPL-MCNC: 7.8 G/DL (ref 6–8.4)
RBC # BLD AUTO: 5.42 M/UL (ref 4–5.4)
SATURATED IRON: 7 % (ref 20–50)
SODIUM SERPL-SCNC: 140 MMOL/L (ref 136–145)
TOTAL IRON BINDING CAPACITY: 596 UG/DL (ref 250–450)
TRANSFERRIN SERPL-MCNC: 403 MG/DL (ref 200–375)
TRIGL SERPL-MCNC: 108 MG/DL (ref 30–150)
TSH SERPL DL<=0.005 MIU/L-ACNC: 2.59 UIU/ML (ref 0.4–4)
VIT B12 SERPL-MCNC: 375 PG/ML (ref 210–950)
WBC # BLD AUTO: 6.2 K/UL (ref 3.9–12.7)

## 2020-03-13 PROCEDURE — 99214 OFFICE O/P EST MOD 30 MIN: CPT | Mod: S$GLB,,, | Performed by: INTERNAL MEDICINE

## 2020-03-13 PROCEDURE — 82607 VITAMIN B-12: CPT

## 2020-03-13 PROCEDURE — 85025 COMPLETE CBC W/AUTO DIFF WBC: CPT

## 2020-03-13 PROCEDURE — 82306 VITAMIN D 25 HYDROXY: CPT

## 2020-03-13 PROCEDURE — 82728 ASSAY OF FERRITIN: CPT

## 2020-03-13 PROCEDURE — 83036 HEMOGLOBIN GLYCOSYLATED A1C: CPT

## 2020-03-13 PROCEDURE — 86703 HIV-1/HIV-2 1 RESULT ANTBDY: CPT

## 2020-03-13 PROCEDURE — 80053 COMPREHEN METABOLIC PANEL: CPT

## 2020-03-13 PROCEDURE — 80061 LIPID PANEL: CPT

## 2020-03-13 PROCEDURE — 99999 PR PBB SHADOW E&M-EST. PATIENT-LVL IV: CPT | Mod: PBBFAC,,, | Performed by: INTERNAL MEDICINE

## 2020-03-13 PROCEDURE — 83735 ASSAY OF MAGNESIUM: CPT

## 2020-03-13 PROCEDURE — 83540 ASSAY OF IRON: CPT

## 2020-03-13 PROCEDURE — 99999 PR PBB SHADOW E&M-EST. PATIENT-LVL IV: ICD-10-PCS | Mod: PBBFAC,,, | Performed by: INTERNAL MEDICINE

## 2020-03-13 PROCEDURE — 99214 PR OFFICE/OUTPT VISIT, EST, LEVL IV, 30-39 MIN: ICD-10-PCS | Mod: S$GLB,,, | Performed by: INTERNAL MEDICINE

## 2020-03-13 PROCEDURE — 84443 ASSAY THYROID STIM HORMONE: CPT

## 2020-03-13 PROCEDURE — 83921 ORGANIC ACID SINGLE QUANT: CPT

## 2020-03-13 PROCEDURE — 36415 COLL VENOUS BLD VENIPUNCTURE: CPT | Mod: PO

## 2020-03-13 RX ORDER — METOPROLOL TARTRATE 50 MG/1
25 TABLET ORAL DAILY
Qty: 90 TABLET | Refills: 1
Start: 2020-03-13 | End: 2020-05-18 | Stop reason: SDUPTHER

## 2020-03-13 RX ORDER — METHYLPREDNISOLONE 4 MG/1
TABLET ORAL
Qty: 21 TABLET | Refills: 0 | Status: SHIPPED | OUTPATIENT
Start: 2020-03-13 | End: 2020-06-01

## 2020-03-13 NOTE — PATIENT INSTRUCTIONS
1. Reduce metoprolol to 25 mg daily. (1/2 of the 50 mg). Monitor for blood pressure and heart rate response.    2. I sent in medrol dose pack (steroids).    3. Hold wellbutrin Friday-Monday and monitor for response. If no change then start the medrol dose pack.     Elizabeth Burns M.D.

## 2020-03-13 NOTE — PROGRESS NOTES
Subjective:      Patient ID: Karolina Jacobs is a 54 y.o. female.    Chief Complaint: Allergic Reaction (rash on legs )    Allergic Reaction   Associated symptoms include a rash. Pertinent negatives include no abdominal pain, chest pain, coughing, stridor or wheezing.      54F here for rash.  Last seen to establish care on 02/27/2020.    At that visit she was endorsing low energy, fatigue, and difficulty with attention.  Labs were ordered to rule out metabolic etiology, but we reduced her metoprolol from 50 mg twice daily down to 25 mg twice daily as she was intermittently compliant.  Her blood pressure today and heart rate are stable and she does feel an improvement in her energy.  She was also started on Wellbutrin  mg daily and her aspirin was reduced to 81 mg daily.  She picked up the medication on 02/28 and has been taking since then.  On Sunday she noted a rash on her lower extremities that she thought was due to a dull razor, however she also has the rash on her abdomen, but she does shave her abdomen and on her back.  No lesions on her arms, but she does have some on her breast.  She did take a bath recently, but the bath was after the rash had appeared.  No other medication changes.  No new perfumes or detergents.  She is doing very well with the medicine and has lost 3 lb.  She does not want to stop, but is unsure if this is a drug reaction.  No fevers or chills.  No difficulty swallowing or anaphylaxis.  No shortness of breath.    Review of patient's allergies indicates:   Allergen Reactions    Sulfa (sulfonamide antibiotics) Other (See Comments)     Unknown, pt was a child when she had reaction       Current Outpatient Medications:     ALPRAZolam (XANAX) 0.25 MG tablet, Take 1 tablet (0.25 mg total) by mouth 2 (two) times daily as needed for Anxiety., Disp: 30 tablet, Rfl: 0    aspirin (ECOTRIN) 81 MG EC tablet, Take 1 tablet (81 mg total) by mouth once daily., Disp: , Rfl:     buPROPion  (WELLBUTRIN XL) 150 MG TB24 tablet, Take 1 tablet (150 mg total) by mouth once daily., Disp: 30 tablet, Rfl: 0    lisinopril 10 MG tablet, Take 10 mg by mouth once daily., Disp: , Rfl:     metoprolol tartrate (LOPRESSOR) 50 MG tablet, Take 0.5 tablets (25 mg total) by mouth once daily., Disp: 90 tablet, Rfl: 1    methylPREDNISolone (MEDROL DOSEPACK) 4 mg tablet, follow package directions, Disp: 21 tablet, Rfl: 0    Past Medical History:   Diagnosis Date    Essential hypertension 08/11/2017    Iron deficiency anemia due to chronic blood loss 8/22/2017    Non-rheumatic mitral regurgitation 07/10/2017    Obstructive sleep apnea 04/17/2019    S/P mitral valve repair 9/25/2017     Past Surgical History:   Procedure Laterality Date    MITRAL VALVE REPAIR  09/2017    TUBAL LIGATION  1990's     Family History   Problem Relation Age of Onset    Heart disease Father     Colon cancer Sister     Breast cancer Sister      Social History     Socioeconomic History    Marital status:      Spouse name: Not on file    Number of children: Not on file    Years of education: Not on file    Highest education level: Not on file   Occupational History    Not on file   Social Needs    Financial resource strain: Not on file    Food insecurity:     Worry: Not on file     Inability: Not on file    Transportation needs:     Medical: Not on file     Non-medical: Not on file   Tobacco Use    Smoking status: Never Smoker    Smokeless tobacco: Never Used   Substance and Sexual Activity    Alcohol use: No    Drug use: Never    Sexual activity: Not Currently     Partners: Male     Birth control/protection: Surgical   Lifestyle    Physical activity:     Days per week: Not on file     Minutes per session: Not on file    Stress: Not on file   Relationships    Social connections:     Talks on phone: Not on file     Gets together: Not on file     Attends [employer] service: Not on file     Active member of club or  "organization: Not on file     Attends meetings of clubs or organizations: Not on file     Relationship status: Not on file   Other Topics Concern    Not on file   Social History Narrative    Not on file      Review of Systems   Constitutional: Negative for chills, fatigue (improved) and fever.   HENT: Negative for congestion.    Eyes: Negative for visual disturbance.   Respiratory: Negative for cough, shortness of breath, wheezing and stridor.    Cardiovascular: Negative for chest pain and palpitations.   Gastrointestinal: Negative for abdominal pain and nausea.   Endocrine: Negative for cold intolerance and heat intolerance.   Genitourinary: Positive for menstrual problem (heavy cycles). Negative for decreased urine volume, dyspareunia, dysuria, frequency, urgency, vaginal bleeding, vaginal discharge and vaginal pain.   Musculoskeletal: Negative for back pain and myalgias.   Skin: Positive for rash.   Allergic/Immunologic: Negative for environmental allergies.   Neurological: Negative for dizziness, syncope, weakness and headaches.   Hematological: Does not bruise/bleed easily.   Psychiatric/Behavioral: Negative for decreased concentration (improved), dysphoric mood and sleep disturbance. The patient is not nervous/anxious.          Objective:     Body mass index is 27.92 kg/m².  /77 (BP Location: Right arm, Patient Position: Sitting, BP Method: Medium (Automatic))   Pulse 67   Temp 98.2 °F (36.8 °C)   Resp 16   Ht 5' 6" (1.676 m)   Wt 78.5 kg (173 lb)   SpO2 99%   BMI 27.92 kg/m²       Physical Exam   Constitutional: She is oriented to person, place, and time. She appears well-developed and well-nourished. No distress.   HENT:   Head: Normocephalic and atraumatic.   Mouth/Throat: Oropharynx is clear and moist.   Eyes: Conjunctivae are normal.   Cardiovascular: Normal rate, regular rhythm, normal heart sounds and intact distal pulses.   No murmur heard.  Well healed sternal scar from MVR "   Pulmonary/Chest: Effort normal and breath sounds normal. No stridor. No respiratory distress. She has no wheezes.   Abdominal: Soft. Bowel sounds are normal.   Musculoskeletal: She exhibits no tenderness.   Lymphadenopathy:     She has no cervical adenopathy.   Neurological: She is alert and oriented to person, place, and time. No sensory deficit.   Skin: Skin is warm and dry. Capillary refill takes 2 to 3 seconds. Rash noted. She is not diaphoretic.   Palpable papules on lower extremities sparing the feet and lower abdomen and lower back.  Small area between her breast.  No excoriations.  No erythema.  Nontender.  No vesicles noted.   Psychiatric: She has a normal mood and affect. Her behavior is normal.   Nursing note and vitals reviewed.      Lab Visit on 03/13/2020   Component Date Value Ref Range Status    Specimen UA 03/13/2020 Urine, Clean Catch   Final    Color, UA 03/13/2020 Yellow  Yellow, Straw, Ernestina Final    Appearance, UA 03/13/2020 Clear  Clear Final    pH, UA 03/13/2020 6.0  5.0 - 8.0 Final    Specific Stafford, UA 03/13/2020 1.020  1.005 - 1.030 Final    Protein, UA 03/13/2020 Trace* Negative Final    Comment: Recommend a 24 hour urine protein or a urine   protein/creatinine ratio if globulin induced proteinuria is  clinically suspected.      Glucose, UA 03/13/2020 Negative  Negative Final    Ketones, UA 03/13/2020 Negative  Negative Final    Bilirubin (UA) 03/13/2020 Negative  Negative Final    Occult Blood UA 03/13/2020 Negative  Negative Final    Nitrite, UA 03/13/2020 Negative  Negative Final    Leukocytes, UA 03/13/2020 Trace* Negative Final    RBC, UA 03/13/2020 1  0 - 4 /hpf Final    WBC, UA 03/13/2020 5  0 - 5 /hpf Final    Bacteria 03/13/2020 Many* None-Occ /hpf Final    Squam Epithel, UA 03/13/2020 10  /hpf Final    Microscopic Comment 03/13/2020 SEE COMMENT   Final    Comment: Other formed elements not mentioned in the report are not   present in the microscopic  examination.        No results found in the last 24 hours.   Assessment:       ICD-10-CM ICD-9-CM   1. Rash R21 782.1   2. Essential hypertension I10 401.9   3. Overweight (BMI 25.0-29.9) E66.3 278.02       Plan:     #Rash  -discussed that this could be potential folliculitis/contact reaction on or secondary to Wellbutrin.  She has been on the medication for over 2 weeks now and the rash appeared on Sunday.  She is hesitant to stop the medication as her fatigue and energy level is improving as well as her concentration issues.  Advised we will hold the Wellbutrin from now until Monday and monitor the response of the rash.  If it resolves without intervention than likely culprit is drug reaction, however if still present advised to start Medrol Dosepak and monitor.  We can attempt to reintroduce, but for now would not like to continue without certainty that this is the culprit.  Patient in agreement.  No respiratory distress or symptoms of anaphylaxis.    #NEGRITA  -has lost weight. Study done at [place]. Get records. Does not have machine at home.     #Attention issues, fatigue- improving  -likely metabolic related to SARAH vs. Vitamin deficiency --> labs pending  -also discussed could be 2/2 low BP & HR  -at last visit reduced toprol to 25 mg BID (1/2 50 mg BID) & reduced ASA to 81 mg daily  -advised that blood pressure and heart rate are stable, therefore recommend reducing metoprolol further to 25 mg daily and monitoring blood pressure and heart rate  -doing well on Wellbutrin, but unsure if this is the culprit of the rash, therefore we will hold and monitor.    #Anxiety  -Refilled xanax PRN 6/6/18. Reduce to 0.25 mg. #30    #Microcytic anemia  -10/10/19: h/h/ 11.6/37.7, MCV 75, RDW 16.8    #MR S/p MVR  -TTE 3/13/18: EF 60-65% with normal DD  -reduced asa 162 mg daily to 81 mg daily at last visit     #HTN  -well controlled. 122/77, HR 67 today  -Continue Lisinopril 10 mg daily   -Metoprolol Tartrate 50 mg BID -->  reduced to 25 bid at last visit   -Doing well with improvement in energy, attention, and BP stable. Will reduce metoprolol to 25 mg daily  -monitor HR and BP    #Overweight-BMI 27.92  -continue healthy diet and exercise   -176 --> 173. Congratulated on weight loss!    #HCM  -Mammogram 7/24/19: birads 1.   -Pap. 5/30/19. Dr. Mcclellan. Negative for malignancy & HPV.   -Colonoscopy 3/20/17. Dr. Richey. [place]. Normal. Repeat in 5 years due to family history.   -TDAP 2/27/20    Get records from dr. Young and sleep study    Return in 6 months.     Immunization History   Administered Date(s) Administered    Tdap 02/27/2020       Elizabeth Burns M.D.    No orders of the defined types were placed in this encounter.     Medications Ordered This Encounter   Medications    methylPREDNISolone (MEDROL DOSEPACK) 4 mg tablet     Sig: follow package directions     Dispense:  21 tablet     Refill:  0    metoprolol tartrate (LOPRESSOR) 50 MG tablet     Sig: Take 0.5 tablets (25 mg total) by mouth once daily.     Dispense:  90 tablet     Refill:  1

## 2020-03-16 LAB — HIV 1+2 AB+HIV1 P24 AG SERPL QL IA: NEGATIVE

## 2020-03-17 DIAGNOSIS — Z79.82 ASPIRIN LONG-TERM USE: Chronic | ICD-10-CM

## 2020-03-17 DIAGNOSIS — D50.0 IRON DEFICIENCY ANEMIA DUE TO CHRONIC BLOOD LOSS: Primary | ICD-10-CM

## 2020-03-17 LAB — METHYLMALONATE SERPL-SCNC: 0.24 UMOL/L

## 2020-03-17 RX ORDER — FERROUS SULFATE 325(65) MG
325 TABLET ORAL
Qty: 90 TABLET | Refills: 0 | Status: SHIPPED | OUTPATIENT
Start: 2020-03-17 | End: 2020-09-16

## 2020-05-18 DIAGNOSIS — I10 ESSENTIAL HYPERTENSION: Chronic | ICD-10-CM

## 2020-05-18 RX ORDER — METOPROLOL TARTRATE 50 MG/1
25 TABLET ORAL DAILY
Qty: 15 TABLET | Refills: 0
Start: 2020-05-18 | End: 2020-09-16 | Stop reason: SDUPTHER

## 2020-05-18 RX ORDER — LISINOPRIL 10 MG/1
10 TABLET ORAL DAILY
Qty: 30 TABLET | Refills: 0 | Status: SHIPPED | OUTPATIENT
Start: 2020-05-18 | End: 2020-06-17

## 2020-05-18 NOTE — TELEPHONE ENCOUNTER
----- Message from Danielle Jennings sent at 5/18/2020  1:28 PM CDT -----  Contact: pt  Type:  RX Refill Request    Who Called: pt  Refill or New Rx:refill  RX Name and Strength:metoprolol tartrate (LOPRESSOR) 50 MG tablet  How is the patient currently taking it? (ex. 1XDay):1x  Is this a 30 day or 90 day RX:90  Preferred Pharmacy with phone number:  [facility] [facility] [place] [facility] [address] [facility]  [address] [address]  Phone: [phone] Fax: [phone]  Local or Mail Order:local   Ordering Provider:georges  Would the patient rather a call back or a response via [facility]? Call back   Best Call Back Number:[phone] (home)   Additional Information:  Pt also needs a refill for (lisinopril 10 MG tablet), she only has one pill left

## 2020-05-19 ENCOUNTER — TELEPHONE (OUTPATIENT)
Dept: FAMILY MEDICINE | Facility: CLINIC | Age: 55
End: 2020-05-19

## 2020-05-19 NOTE — TELEPHONE ENCOUNTER
----- Message from Vega Julien sent at 5/19/2020  9:32 AM CDT -----  Contact: Self-   .Type:  Patient Returning Call    Who Called:Karolina Jacobs    Who Left Message for Patient:unsure   Does the patient know what this is regarding?:unsure   Would the patient rather a call back or a response via [facility]? Call back   Best Call Back Number:.[phone] (home)   Additional Information:     Thank You,   Vega Julien

## 2020-05-25 ENCOUNTER — LAB VISIT (OUTPATIENT)
Dept: LAB | Facility: HOSPITAL | Age: 55
End: 2020-05-25
Attending: INTERNAL MEDICINE
Payer: COMMERCIAL

## 2020-05-25 DIAGNOSIS — D50.0 IRON DEFICIENCY ANEMIA DUE TO CHRONIC BLOOD LOSS: ICD-10-CM

## 2020-05-25 DIAGNOSIS — Z79.82 ASPIRIN LONG-TERM USE: Chronic | ICD-10-CM

## 2020-05-25 LAB
BASOPHILS # BLD AUTO: 0.09 K/UL (ref 0–0.2)
BASOPHILS NFR BLD: 1.2 % (ref 0–1.9)
DIFFERENTIAL METHOD: ABNORMAL
EOSINOPHIL # BLD AUTO: 0.4 K/UL (ref 0–0.5)
EOSINOPHIL NFR BLD: 4.5 % (ref 0–8)
ERYTHROCYTE [DISTWIDTH] IN BLOOD BY AUTOMATED COUNT: 15 % (ref 11.5–14.5)
FERRITIN SERPL-MCNC: 4 NG/ML (ref 20–300)
HCT VFR BLD AUTO: 40.9 % (ref 37–48.5)
HGB BLD-MCNC: 12.3 G/DL (ref 12–16)
IMM GRANULOCYTES # BLD AUTO: 0.02 K/UL (ref 0–0.04)
IMM GRANULOCYTES NFR BLD AUTO: 0.3 % (ref 0–0.5)
IRON SERPL-MCNC: 40 UG/DL (ref 30–160)
LYMPHOCYTES # BLD AUTO: 2.1 K/UL (ref 1–4.8)
LYMPHOCYTES NFR BLD: 26.8 % (ref 18–48)
MCH RBC QN AUTO: 25.6 PG (ref 27–31)
MCHC RBC AUTO-ENTMCNC: 30.1 G/DL (ref 32–36)
MCV RBC AUTO: 85 FL (ref 82–98)
MONOCYTES # BLD AUTO: 0.6 K/UL (ref 0.3–1)
MONOCYTES NFR BLD: 7.6 % (ref 4–15)
NEUTROPHILS # BLD AUTO: 4.7 K/UL (ref 1.8–7.7)
NEUTROPHILS NFR BLD: 59.6 % (ref 38–73)
NRBC BLD-RTO: 0 /100 WBC
PLATELET # BLD AUTO: 229 K/UL (ref 150–350)
PMV BLD AUTO: 12.4 FL (ref 9.2–12.9)
RBC # BLD AUTO: 4.8 M/UL (ref 4–5.4)
RETICS/RBC NFR AUTO: 1 % (ref 0.5–2.5)
SATURATED IRON: 7 % (ref 20–50)
TOTAL IRON BINDING CAPACITY: 586 UG/DL (ref 250–450)
TRANSFERRIN SERPL-MCNC: 396 MG/DL (ref 200–375)
WBC # BLD AUTO: 7.81 K/UL (ref 3.9–12.7)

## 2020-05-25 PROCEDURE — 85025 COMPLETE CBC W/AUTO DIFF WBC: CPT

## 2020-05-25 PROCEDURE — 83540 ASSAY OF IRON: CPT

## 2020-05-25 PROCEDURE — 36415 COLL VENOUS BLD VENIPUNCTURE: CPT | Mod: PO

## 2020-05-25 PROCEDURE — 85045 AUTOMATED RETICULOCYTE COUNT: CPT

## 2020-05-25 PROCEDURE — 82728 ASSAY OF FERRITIN: CPT

## 2020-05-27 NOTE — PROGRESS NOTES
Please CALL patient with below results.    I have reviewed your recent lab work.    Your iron studies continue to be low.  Be sure that your taking daily iron.  Can take this with orange juice to improve absorption.  Follow-up with Dr. Burns as scheduled next week for discussion of further evaluation and treatment.    Please let me know if patient has any additional questions or concerns about above.

## 2020-06-01 ENCOUNTER — OFFICE VISIT (OUTPATIENT)
Dept: FAMILY MEDICINE | Facility: CLINIC | Age: 55
End: 2020-06-01
Payer: COMMERCIAL

## 2020-06-01 VITALS
SYSTOLIC BLOOD PRESSURE: 119 MMHG | TEMPERATURE: 97 F | DIASTOLIC BLOOD PRESSURE: 69 MMHG | WEIGHT: 177.19 LBS | BODY MASS INDEX: 28.48 KG/M2 | HEIGHT: 66 IN | HEART RATE: 72 BPM

## 2020-06-01 DIAGNOSIS — E66.3 OVERWEIGHT (BMI 25.0-29.9): ICD-10-CM

## 2020-06-01 DIAGNOSIS — R41.840 ATTENTION DEFICIT: ICD-10-CM

## 2020-06-01 DIAGNOSIS — D50.0 IRON DEFICIENCY ANEMIA DUE TO CHRONIC BLOOD LOSS: Primary | ICD-10-CM

## 2020-06-01 DIAGNOSIS — I10 ESSENTIAL HYPERTENSION: Chronic | ICD-10-CM

## 2020-06-01 DIAGNOSIS — D25.9 UTERINE LEIOMYOMA, UNSPECIFIED LOCATION: ICD-10-CM

## 2020-06-01 DIAGNOSIS — Z79.82 ASPIRIN LONG-TERM USE: Chronic | ICD-10-CM

## 2020-06-01 PROCEDURE — 99214 OFFICE O/P EST MOD 30 MIN: CPT | Mod: S$GLB,,, | Performed by: INTERNAL MEDICINE

## 2020-06-01 PROCEDURE — 99214 PR OFFICE/OUTPT VISIT, EST, LEVL IV, 30-39 MIN: ICD-10-PCS | Mod: S$GLB,,, | Performed by: INTERNAL MEDICINE

## 2020-06-01 PROCEDURE — 99999 PR PBB SHADOW E&M-EST. PATIENT-LVL III: ICD-10-PCS | Mod: PBBFAC,,, | Performed by: INTERNAL MEDICINE

## 2020-06-01 PROCEDURE — 99999 PR PBB SHADOW E&M-EST. PATIENT-LVL III: CPT | Mod: PBBFAC,,, | Performed by: INTERNAL MEDICINE

## 2020-06-01 RX ORDER — BUPROPION HYDROCHLORIDE 150 MG/1
150 TABLET ORAL DAILY
Qty: 30 TABLET | Refills: 0 | Status: SHIPPED | OUTPATIENT
Start: 2020-06-01 | End: 2020-07-17

## 2020-06-01 NOTE — PROGRESS NOTES
Subjective:      Patient ID: Karolina Jacobs is a 55 y.o. female.    Chief Complaint: Results    HPI   55F here to follow up results of SARAH and restarting wellbutrin.    She previously started the Wellbutrin and had good success with improvement in attention, fatigue, and appetite suppression, however developed a rash that she was unsure if was related to the medication or 8 contact exposure.  She discontinued the medication and had resolution of the rash.  She would like to retry the medication again as it gave good success.  Advised we can do another trial and if a rash develops discontinue add to allergy lists and consider whether sustained released may be alternative.    Repeat labs from May 25th reviewed showing continued iron deficiency anemia likely due to uterine leiomyoma and heavy cycles.  [facility] and and referral made to [facility] per request for injectafer.  Advised to continue iron supplementation for the time being.     Blood pressure is well controlled on lisinopril 10 mg daily and metoprolol 25 mg daily.  Continues on other medications.  No complaints otherwise.    Review of patient's allergies indicates:   Allergen Reactions    Sulfa (sulfonamide antibiotics) Other (See Comments)     Unknown, pt was a child when she had reaction       Current Outpatient Medications:     ALPRAZolam (XANAX) 0.25 MG tablet, Take 1 tablet (0.25 mg total) by mouth 2 (two) times daily as needed for Anxiety., Disp: 30 tablet, Rfl: 0    aspirin (ECOTRIN) 81 MG EC tablet, Take 1 tablet (81 mg total) by mouth once daily., Disp: , Rfl:     ferrous sulfate (FEOSOL) 325 mg (65 mg iron) Tab tablet, Take 1 tablet (325 mg total) by mouth daily with breakfast. Take with stool softener, Disp: 90 tablet, Rfl: 0    lisinopriL 10 MG tablet, Take 1 tablet (10 mg total) by mouth once daily., Disp: 30 tablet, Rfl: 0    metoprolol tartrate (LOPRESSOR) 50 MG tablet, Take 0.5 tablets (25 mg total) by  mouth once daily., Disp: 15 tablet, Rfl: 0    ferric carboxymaltose (INJECTAFER) 50 iron mg/mL injection, Inject 15 mLs (750 mg total) into the vein once. 2 doses for 1 dose, Disp: 15 mL, Rfl: 0    Past Medical History:   Diagnosis Date    Essential hypertension 08/11/2017    Iron deficiency anemia due to chronic blood loss 8/22/2017    Non-rheumatic mitral regurgitation 07/10/2017    Obstructive sleep apnea 04/17/2019    S/P mitral valve repair 9/25/2017     Past Surgical History:   Procedure Laterality Date    MITRAL VALVE REPAIR  09/2017    TUBAL LIGATION  1990's     Family History   Problem Relation Age of Onset    Heart disease Father     Colon cancer Sister     Breast cancer Sister      Social History     Socioeconomic History    Marital status:      Spouse name: Not on file    Number of children: Not on file    Years of education: Not on file    Highest education level: Not on file   Occupational History    Not on file   Social Needs    Financial resource strain: Not on file    Food insecurity:     Worry: Not on file     Inability: Not on file    Transportation needs:     Medical: Not on file     Non-medical: Not on file   Tobacco Use    Smoking status: Never Smoker    Smokeless tobacco: Never Used   Substance and Sexual Activity    Alcohol use: No    Drug use: Never    Sexual activity: Not Currently     Partners: Male     Birth control/protection: Surgical   Lifestyle    Physical activity:     Days per week: Not on file     Minutes per session: Not on file    Stress: Not on file   Relationships    Social connections:     Talks on phone: Not on file     Gets together: Not on file     Attends Denominational service: Not on file     Active member of club or organization: Not on file     Attends meetings of clubs or organizations: Not on file     Relationship status: Not on file   Other Topics Concern    Not on file   Social History Narrative    Not on file      Review of Systems  "  Constitutional: Positive for fatigue. Negative for chills and fever.   HENT: Negative for congestion.    Eyes: Negative for visual disturbance.   Respiratory: Negative for shortness of breath.    Cardiovascular: Negative for palpitations.   Gastrointestinal: Negative for nausea.   Endocrine: Negative for cold intolerance and heat intolerance.   Genitourinary: Positive for menstrual problem. Negative for dysuria.   Musculoskeletal: Negative for back pain and myalgias.   Skin: Negative for rash.   Allergic/Immunologic: Negative for environmental allergies.   Neurological: Negative for dizziness, syncope and headaches.   Hematological: Does not bruise/bleed easily.   Psychiatric/Behavioral: Positive for decreased concentration. Negative for dysphoric mood and sleep disturbance.         Objective:     Body mass index is 28.6 kg/m².  /69   Pulse 72   Temp 97 °F (36.1 °C) (Oral)   Ht 5' 6" (1.676 m)   Wt 80.4 kg (177 lb 3.2 oz)   LMP 05/20/2020   BMI 28.60 kg/m²       Physical Exam   Constitutional: She is oriented to person, place, and time. She appears well-developed and well-nourished. No distress.   HENT:   Head: Normocephalic and atraumatic.   Mouth/Throat: Oropharynx is clear and moist.   Eyes: Conjunctivae are normal.   Cardiovascular: Normal rate, regular rhythm, normal heart sounds and intact distal pulses.   No murmur heard.  Well healed sternal scar from MVR   Pulmonary/Chest: Effort normal and breath sounds normal. No stridor. No respiratory distress. She has no wheezes.   Abdominal: Soft. Bowel sounds are normal.   Musculoskeletal: She exhibits no tenderness.   Lymphadenopathy:     She has no cervical adenopathy.   Neurological: She is alert and oriented to person, place, and time. No sensory deficit.   Skin: Skin is warm and dry. Capillary refill takes 2 to 3 seconds. No rash noted. She is not diaphoretic.   Psychiatric: She has a normal mood and affect. Her behavior is normal.   Nursing note " and vitals reviewed.      Lab Visit on 05/25/2020   Component Date Value Ref Range Status    WBC 05/25/2020 7.81  3.90 - 12.70 K/uL Final    RBC 05/25/2020 4.80  4.00 - 5.40 M/uL Final    Hemoglobin 05/25/2020 12.3  12.0 - 16.0 g/dL Final    Hematocrit 05/25/2020 40.9  37.0 - 48.5 % Final    Mean Corpuscular Volume 05/25/2020 85  82 - 98 fL Final    Mean Corpuscular Hemoglobin 05/25/2020 25.6* 27.0 - 31.0 pg Final    Mean Corpuscular Hemoglobin Conc 05/25/2020 30.1* 32.0 - 36.0 g/dL Final    RDW 05/25/2020 15.0* 11.5 - 14.5 % Final    Platelets 05/25/2020 229  150 - 350 K/uL Final    MPV 05/25/2020 12.4  9.2 - 12.9 fL Final    Immature Granulocytes 05/25/2020 0.3  0.0 - 0.5 % Final    Gran # (ANC) 05/25/2020 4.7  1.8 - 7.7 K/uL Final    Immature Grans (Abs) 05/25/2020 0.02  0.00 - 0.04 K/uL Final    Comment: Mild elevation in immature granulocytes is non specific and   can be seen in a variety of conditions including stress response,   acute inflammation, trauma and pregnancy. Correlation with other   laboratory and clinical findings is essential.      Lymph # 05/25/2020 2.1  1.0 - 4.8 K/uL Final    Mono # 05/25/2020 0.6  0.3 - 1.0 K/uL Final    Eos # 05/25/2020 0.4  0.0 - 0.5 K/uL Final    Baso # 05/25/2020 0.09  0.00 - 0.20 K/uL Final    nRBC 05/25/2020 0  0 /100 WBC Final    Gran% 05/25/2020 59.6  38.0 - 73.0 % Final    Lymph% 05/25/2020 26.8  18.0 - 48.0 % Final    Mono% 05/25/2020 7.6  4.0 - 15.0 % Final    Eosinophil% 05/25/2020 4.5  0.0 - 8.0 % Final    Basophil% 05/25/2020 1.2  0.0 - 1.9 % Final    Differential Method 05/25/2020 Automated   Final    Iron 05/25/2020 40  30 - 160 ug/dL Final    Transferrin 05/25/2020 396* 200 - 375 mg/dL Final    TIBC 05/25/2020 586* 250 - 450 ug/dL Final    Saturated Iron 05/25/2020 7* 20 - 50 % Final    Ferritin 05/25/2020 4* 20.0 - 300.0 ng/mL Final    Retic 05/25/2020 1.0  0.5 - 2.5 % Final   Lab Visit on 03/13/2020   Component Date Value Ref  Range Status    WBC 03/13/2020 6.20  3.90 - 12.70 K/uL Final    RBC 03/13/2020 5.42* 4.00 - 5.40 M/uL Final    Hemoglobin 03/13/2020 12.8  12.0 - 16.0 g/dL Final    Hematocrit 03/13/2020 45.1  37.0 - 48.5 % Final    Mean Corpuscular Volume 03/13/2020 83  82 - 98 fL Final    Mean Corpuscular Hemoglobin 03/13/2020 23.6* 27.0 - 31.0 pg Final    Mean Corpuscular Hemoglobin Conc 03/13/2020 28.4* 32.0 - 36.0 g/dL Final    RDW 03/13/2020 16.8* 11.5 - 14.5 % Final    Platelets 03/13/2020 229  150 - 350 K/uL Final    MPV 03/13/2020 12.6  9.2 - 12.9 fL Final    Immature Granulocytes 03/13/2020 0.2  0.0 - 0.5 % Final    Gran # (ANC) 03/13/2020 3.5  1.8 - 7.7 K/uL Final    Immature Grans (Abs) 03/13/2020 0.01  0.00 - 0.04 K/uL Final    Comment: Mild elevation in immature granulocytes is non specific and   can be seen in a variety of conditions including stress response,   acute inflammation, trauma and pregnancy. Correlation with other   laboratory and clinical findings is essential.      Lymph # 03/13/2020 1.8  1.0 - 4.8 K/uL Final    Mono # 03/13/2020 0.4  0.3 - 1.0 K/uL Final    Eos # 03/13/2020 0.4  0.0 - 0.5 K/uL Final    Baso # 03/13/2020 0.09  0.00 - 0.20 K/uL Final    nRBC 03/13/2020 0  0 /100 WBC Final    Gran% 03/13/2020 56.1  38.0 - 73.0 % Final    Lymph% 03/13/2020 29.4  18.0 - 48.0 % Final    Mono% 03/13/2020 6.3  4.0 - 15.0 % Final    Eosinophil% 03/13/2020 6.5  0.0 - 8.0 % Final    Basophil% 03/13/2020 1.5  0.0 - 1.9 % Final    Differential Method 03/13/2020 Automated   Final    Sodium 03/13/2020 140  136 - 145 mmol/L Final    Potassium 03/13/2020 4.1  3.5 - 5.1 mmol/L Final    Chloride 03/13/2020 104  95 - 110 mmol/L Final    CO2 03/13/2020 28  23 - 29 mmol/L Final    Glucose 03/13/2020 96  70 - 110 mg/dL Final    BUN, Bld 03/13/2020 11  6 - 20 mg/dL Final    Creatinine 03/13/2020 0.9  0.5 - 1.4 mg/dL Final    Calcium 03/13/2020 9.8  8.7 - 10.5 mg/dL Final    Total Protein  03/13/2020 7.8  6.0 - 8.4 g/dL Final    Albumin 03/13/2020 4.3  3.5 - 5.2 g/dL Final    Total Bilirubin 03/13/2020 0.6  0.1 - 1.0 mg/dL Final    Comment: For infants and newborns, interpretation of results should be based  on gestational age, weight and in agreement with clinical  observations.  Premature Infant recommended reference ranges:  Up to 24 hours.............<8.0 mg/dL  Up to 48 hours............<12.0 mg/dL  3-5 days..................<15.0 mg/dL  6-29 days.................<15.0 mg/dL      Alkaline Phosphatase 03/13/2020 75  55 - 135 U/L Final    AST 03/13/2020 19  10 - 40 U/L Final    ALT 03/13/2020 16  10 - 44 U/L Final    Anion Gap 03/13/2020 8  8 - 16 mmol/L Final    eGFR if African American 03/13/2020 >60.0  >60 mL/min/1.73 m^2 Final    eGFR if non African American 03/13/2020 >60.0  >60 mL/min/1.73 m^2 Final    Comment: Calculation used to obtain the estimated glomerular filtration  rate (eGFR) is the CKD-EPI equation.       Hemoglobin A1C 03/13/2020 5.3  4.0 - 5.6 % Final    Comment: ADA Screening Guidelines:  5.7-6.4%  Consistent with prediabetes  >or=6.5%  Consistent with diabetes  High levels of fetal hemoglobin interfere with the HbA1C  assay. Heterozygous hemoglobin variants (HbS, HgC, etc)do  not significantly interfere with this assay.   However, presence of multiple variants may affect accuracy.      Estimated Avg Glucose 03/13/2020 105  68 - 131 mg/dL Final    Cholesterol 03/13/2020 171  120 - 199 mg/dL Final    Comment: The National Cholesterol Education Program (NCEP) has set the  following guidelines (reference ranges) for Cholesterol:  Optimal.....................<200 mg/dL  Borderline High.............200-239 mg/dL  High........................> or = 240 mg/dL      Triglycerides 03/13/2020 108  30 - 150 mg/dL Final    Comment: The National Cholesterol Education Program (NCEP) has set the  following guidelines (reference values) for  triglycerides:  Normal......................<150 mg/dL  Borderline High.............150-199 mg/dL  High........................200-499 mg/dL      HDL 03/13/2020 50  40 - 75 mg/dL Final    Comment: The National Cholesterol Education Program (NCEP) has set the  following guidelines (reference values) for HDL Cholesterol:  Low...............<40 mg/dL  Optimal...........>60 mg/dL      LDL Cholesterol 03/13/2020 99.4  63.0 - 159.0 mg/dL Final    Comment: The National Cholesterol Education Program (NCEP) has set the  following guidelines (reference values) for LDL Cholesterol:  Optimal.......................<130 mg/dL  Borderline High...............130-159 mg/dL  High..........................160-189 mg/dL  Very High.....................>190 mg/dL      Hdl/Cholesterol Ratio 03/13/2020 29.2  20.0 - 50.0 % Final    Total Cholesterol/HDL Ratio 03/13/2020 3.4  2.0 - 5.0 Final    Non-HDL Cholesterol 03/13/2020 121  mg/dL Final    Comment: Risk category and Non-HDL cholesterol goals:  Coronary heart disease (CHD)or equivalent (10-year risk of CHD >20%):  Non-HDL cholesterol goal     <130 mg/dL  Two or more CHD risk factors and 10-year risk of CHD <= 20%:  Non-HDL cholesterol goal     <160 mg/dL  0 to 1 CHD risk factor:  Non-HDL cholesterol goal     <190 mg/dL      TSH 03/13/2020 2.589  0.400 - 4.000 uIU/mL Final    Magnesium 03/13/2020 2.0  1.6 - 2.6 mg/dL Final    Vit D, 25-Hydroxy 03/13/2020 29* 30 - 96 ng/mL Final    Comment: Vitamin D deficiency.........<10 ng/mL                              Vitamin D insufficiency......10-29 ng/mL       Vitamin D sufficiency........> or equal to 30 ng/mL  Vitamin D toxicity............>100 ng/mL      Iron 03/13/2020 42  30 - 160 ug/dL Final    Transferrin 03/13/2020 403* 200 - 375 mg/dL Final    TIBC 03/13/2020 596* 250 - 450 ug/dL Final    Saturated Iron 03/13/2020 7* 20 - 50 % Final    Ferritin 03/13/2020 4* 20.0 - 300.0 ng/mL Final    Vitamin B-12 03/13/2020 375  210 - 950  pg/mL Final    Methlymalonic Acid 03/13/2020 0.24  <0.40 umol/L Final    Comment: If applicable, any drug confirmation testing reported  here was developed and the performance characteristics  determined by [facility]. This   confirmation testing has not been cleared or approved  by the FDA. The laboratory is regulated under CLIA as  qualified to perform high-complexity testing. This test  is used for patient testing purposes. It should not be  regarded as investigational or for research.  Test performed at [facility],  [address], [place]  [address]     [phone]  Arturo Castillo MD  - Medical Director      HIV 1/2 Ag/Ab 03/13/2020 Negative  Negative Final   Lab Visit on 03/13/2020   Component Date Value Ref Range Status    Specimen UA 03/13/2020 Urine, Clean Catch   Final    Color, UA 03/13/2020 Yellow  Yellow, Straw, Ernestina Final    Appearance, UA 03/13/2020 Clear  Clear Final    pH, UA 03/13/2020 6.0  5.0 - 8.0 Final    Specific [place], UA 03/13/2020 1.020  1.005 - 1.030 Final    Protein, UA 03/13/2020 Trace* Negative Final    Comment: Recommend a 24 hour urine protein or a urine   protein/creatinine ratio if globulin induced proteinuria is  clinically suspected.      Glucose, UA 03/13/2020 Negative  Negative Final    Ketones, UA 03/13/2020 Negative  Negative Final    Bilirubin (UA) 03/13/2020 Negative  Negative Final    Occult Blood UA 03/13/2020 Negative  Negative Final    Nitrite, UA 03/13/2020 Negative  Negative Final    Leukocytes, UA 03/13/2020 Trace* Negative Final    RBC, UA 03/13/2020 1  0 - 4 /hpf Final    WBC, UA 03/13/2020 5  0 - 5 /hpf Final    Bacteria 03/13/2020 Many* None-Occ /hpf Final    Squam Epithel, UA 03/13/2020 10  /hpf Final    Microscopic Comment 03/13/2020 SEE COMMENT   Final    Comment: Other formed elements not mentioned in the report are not   present in the microscopic examination.        No results found in the last 24  hours.   Assessment:       ICD-10-CM ICD-9-CM   1. Iron deficiency anemia due to chronic blood loss D50.0 280.0       Plan:     #NEGRITA  -has lost weight. Study done at [place]. Get records. Does not have machine at home.     #Attention issues, fatigue- improving  -likely related to SARAH   -at last visit reduced toprol to 25 mg daily (1/2 50 mg BID) & reduced ASA to 81 mg daily  -did well on Wellbutrin, but unsure if this caused a rash. She would like to retry.      #Anxiety  -Refilled xanax PRN 6/6/18. Reduce to 0.25 mg. #30    #Iron deficiency anemia  #Uterine leiomyoma  -10/10/19: h/h/ 11.6/37.7, MCV 75, RDW 16.8  -5/25/20 reviewed ---> unchanged from prior despite iron. Refer for Injectafer.   -return to obgyn for evaluation of leiomyoma    #MR S/p MVR  -TTE 3/13/18: EF 60-65% with normal DD  -reduced asa 162 mg daily to 81 mg daily at last visit     #HTN  -well controlled. 119/69, HR 72 today  -Continue Lisinopril 10 mg daily   -Metoprolol Tartrate 50 mg BID --> reduced to 25 daily  -Doing well with improvement in energy, attention, and BP stable. Will reduce metoprolol to 25 mg daily  -monitor HR and BP    #Overweight-BMI 28.60  -continue healthy diet and exercise   -176 --> 173 --> 177 today, 6/1    #HCM  -Mammogram 7/24/19: birads 1. Mammo next month.  -Pap. 5/30/19. Dr. Mcclellan. Negative for malignancy & HPV.   -Colonoscopy 3/20/17. Dr. Richey. [place]. Normal. Repeat in 5 years due to family history.   -TDAP 2/27/20    Get records from dr. Young and sleep study    Return in 6 months.     Immunization History   Administered Date(s) Administered    Tdap 02/27/2020       Elizabeth Burns M.D.    No orders of the defined types were placed in this encounter.     Medications Ordered This Encounter   Medications    ferric carboxymaltose (INJECTAFER) 50 iron mg/mL injection     Sig: Inject 15 mLs (750 mg total) into the vein once. 2 doses for 1 dose     Dispense:  15 mL     Refill:  0

## 2020-06-09 ENCOUNTER — TELEPHONE (OUTPATIENT)
Dept: FAMILY MEDICINE | Facility: CLINIC | Age: 55
End: 2020-06-09

## 2020-06-09 NOTE — TELEPHONE ENCOUNTER
Patient ordered for injectafer at [facility]. Call to follow up on status with [facility] and contact patient.

## 2020-06-09 NOTE — TELEPHONE ENCOUNTER
----- Message from Ama Fisher sent at 6/9/2020  9:37 AM CDT -----  Contact: patient  Calling concerning if she's approved for blood transfusion. Please call patient @ [phone]. thanks

## 2020-06-15 ENCOUNTER — TELEPHONE (OUTPATIENT)
Dept: FAMILY MEDICINE | Facility: CLINIC | Age: 55
End: 2020-06-15

## 2020-06-15 DIAGNOSIS — D50.0 IRON DEFICIENCY ANEMIA DUE TO CHRONIC BLOOD LOSS: Primary | ICD-10-CM

## 2020-06-15 RX ORDER — FERRIC CARBOXYMALTOSE 50 MG/ML
750 INJECTION, SOLUTION INTRAVENOUS SEE ADMIN INSTRUCTIONS
Qty: 15 ML | Refills: 0 | Status: SHIPPED | OUTPATIENT
Start: 2020-06-15 | End: 2020-09-16

## 2020-06-15 NOTE — TELEPHONE ENCOUNTER
----- Message from Carlita Tobar sent at 6/15/2020  1:40 PM CDT -----  Contact: [facility]  [facility] with [facility] requesting a call back to clarify an order sent over for pt. Please call Libia back at [phone]

## 2020-06-15 NOTE — TELEPHONE ENCOUNTER
Please reorder injectofer, pharmacist at [facility] says it needs to be ordered at 750mg IV x2 doses given 1 week apart which is the standard. Patients injection is scheduled for tomorrow.

## 2020-06-16 ENCOUNTER — TELEPHONE (OUTPATIENT)
Dept: FAMILY MEDICINE | Facility: CLINIC | Age: 55
End: 2020-06-16

## 2020-06-16 NOTE — TELEPHONE ENCOUNTER
----- Message from Catina Cardoso sent at 6/16/2020 11:03 AM CDT -----  Regarding: [facility]  They are requesting call back in regards to needing new lab order sent over for pt. Pt is currently in office            Pls call back at [phone]

## 2020-06-17 ENCOUNTER — TELEPHONE (OUTPATIENT)
Dept: FAMILY MEDICINE | Facility: CLINIC | Age: 55
End: 2020-06-17

## 2020-06-17 DIAGNOSIS — I10 ESSENTIAL HYPERTENSION: Chronic | ICD-10-CM

## 2020-06-17 RX ORDER — LISINOPRIL 10 MG/1
TABLET ORAL
Qty: 30 TABLET | Refills: 11 | Status: SHIPPED | OUTPATIENT
Start: 2020-06-17 | End: 2020-09-16 | Stop reason: SDUPTHER

## 2020-06-17 RX ORDER — LISINOPRIL 10 MG/1
10 TABLET ORAL DAILY
Qty: 90 TABLET | Refills: 1 | Status: SHIPPED | OUTPATIENT
Start: 2020-06-17 | End: 2020-09-16

## 2020-06-17 NOTE — TELEPHONE ENCOUNTER
----- Message from Iraida Julien sent at 6/17/2020 12:15 PM CDT -----  .Type:  Patient Returning Call    Who Called: pt   Who Left Message for Patient:  Does the patient know what this is regarding?: refill   Would the patient rather a call back or a response via [facility]?  Call back   Best Call Back Number: [phone]  Additional Information: Pt is requesting a call from nurse to send a her refill of the Lisinopril to the pharmacy.      ..  [facility] [address] [place] [address] - [address] [facility]  [address] [address]  Phone: [phone] Fax: [phone]

## 2020-07-29 ENCOUNTER — TELEPHONE (OUTPATIENT)
Dept: FAMILY MEDICINE | Facility: CLINIC | Age: 55
End: 2020-07-29

## 2020-07-29 NOTE — TELEPHONE ENCOUNTER
----- Message from Gregorio Munroe sent at 7/29/2020  2:16 PM CDT -----  Type:  Sooner Apoointment Request    Caller is requesting a sooner appointment.  Caller declined first available appointment listed below.  Caller will not accept being placed on the waitlist and is requesting a message be sent to doctor.  Name of Caller:JOSE ROBERTS   When is the first available appointment  Symptoms: surgery clearance  Would the patient rather a call back or a response via [facility]?  Call   Best Call Back Number: [phone] (home)    Additional Information:    pt surgery is on 08/06/2020 please

## 2020-07-30 NOTE — TELEPHONE ENCOUNTER
Spoke with patient and informed no available appointment with Dr Burns prior to 8/6/20. Pt states her surgery is scheduled for 8/6/20. Offered her an appointment with NP for clearance, but pt states that she had open heart surgery a couple of years ago and has contacted her cardiologist to see if they can do her clearance. She will call back if she needs anything further.

## 2020-09-09 ENCOUNTER — TELEPHONE (OUTPATIENT)
Dept: FAMILY MEDICINE | Facility: CLINIC | Age: 55
End: 2020-09-09

## 2020-09-09 ENCOUNTER — PATIENT OUTREACH (OUTPATIENT)
Dept: ADMINISTRATIVE | Facility: HOSPITAL | Age: 55
End: 2020-09-09

## 2020-09-09 NOTE — PROGRESS NOTES
Chart Review:    Health Maintenance Updated.   Immunizations: Reviewed.  Care Everywhere: Updated.  Care Team: Reviewed  [facility] Search: Pt not found.     Health Maintenance Due   Topic    Hepatitis C Screening     Shingles Vaccine (1 of 2)    Influenza Vaccine (1)

## 2020-09-09 NOTE — TELEPHONE ENCOUNTER
Patient wants to do her labs before her 9/17/20 appointment, please order labs, so we can call patient to schedule

## 2020-09-10 ENCOUNTER — PATIENT OUTREACH (OUTPATIENT)
Dept: ADMINISTRATIVE | Facility: HOSPITAL | Age: 55
End: 2020-09-10

## 2020-09-16 ENCOUNTER — HOSPITAL ENCOUNTER (OUTPATIENT)
Dept: RADIOLOGY | Facility: HOSPITAL | Age: 55
Discharge: HOME | End: 2020-09-16
Attending: INTERNAL MEDICINE
Payer: COMMERCIAL

## 2020-09-16 ENCOUNTER — OFFICE VISIT (OUTPATIENT)
Dept: FAMILY MEDICINE | Facility: CLINIC | Age: 55
End: 2020-09-16
Payer: COMMERCIAL

## 2020-09-16 VITALS
OXYGEN SATURATION: 96 % | SYSTOLIC BLOOD PRESSURE: 113 MMHG | WEIGHT: 182 LBS | BODY MASS INDEX: 29.25 KG/M2 | HEART RATE: 65 BPM | TEMPERATURE: 98 F | DIASTOLIC BLOOD PRESSURE: 63 MMHG | HEIGHT: 66 IN

## 2020-09-16 VITALS — HEIGHT: 66 IN | BODY MASS INDEX: 29.25 KG/M2 | WEIGHT: 182 LBS

## 2020-09-16 DIAGNOSIS — Z79.82 ASPIRIN LONG-TERM USE: ICD-10-CM

## 2020-09-16 DIAGNOSIS — Z12.31 ENCOUNTER FOR SCREENING MAMMOGRAM FOR MALIGNANT NEOPLASM OF BREAST: ICD-10-CM

## 2020-09-16 DIAGNOSIS — Z13.21 ENCOUNTER FOR VITAMIN DEFICIENCY SCREENING: ICD-10-CM

## 2020-09-16 DIAGNOSIS — R63.5 WEIGHT GAIN: ICD-10-CM

## 2020-09-16 DIAGNOSIS — Z98.890 S/P MITRAL VALVE REPAIR: Chronic | ICD-10-CM

## 2020-09-16 DIAGNOSIS — I10 ESSENTIAL HYPERTENSION: ICD-10-CM

## 2020-09-16 DIAGNOSIS — E55.9 VITAMIN D INSUFFICIENCY: ICD-10-CM

## 2020-09-16 DIAGNOSIS — Z98.890 STATUS POST HYSTEROSCOPY: ICD-10-CM

## 2020-09-16 DIAGNOSIS — D25.9 UTERINE LEIOMYOMA, UNSPECIFIED LOCATION: ICD-10-CM

## 2020-09-16 DIAGNOSIS — R53.83 FATIGUE, UNSPECIFIED TYPE: ICD-10-CM

## 2020-09-16 DIAGNOSIS — Z79.82 ASPIRIN LONG-TERM USE: Chronic | ICD-10-CM

## 2020-09-16 DIAGNOSIS — D50.0 IRON DEFICIENCY ANEMIA DUE TO CHRONIC BLOOD LOSS: Primary | ICD-10-CM

## 2020-09-16 DIAGNOSIS — Z13.1 SCREENING FOR DIABETES MELLITUS (DM): ICD-10-CM

## 2020-09-16 DIAGNOSIS — E66.3 OVERWEIGHT (BMI 25.0-29.9): ICD-10-CM

## 2020-09-16 DIAGNOSIS — Z90.710 STATUS POST HYSTERECTOMY: ICD-10-CM

## 2020-09-16 DIAGNOSIS — I10 ESSENTIAL HYPERTENSION: Chronic | ICD-10-CM

## 2020-09-16 DIAGNOSIS — D50.0 IRON DEFICIENCY ANEMIA DUE TO CHRONIC BLOOD LOSS: ICD-10-CM

## 2020-09-16 PROCEDURE — 77067 MAMMO DIGITAL SCREENING BILAT WITH TOMO: ICD-10-PCS | Mod: 26,,, | Performed by: RADIOLOGY

## 2020-09-16 PROCEDURE — 77067 SCR MAMMO BI INCL CAD: CPT | Mod: 26,,, | Performed by: RADIOLOGY

## 2020-09-16 PROCEDURE — 99214 PR OFFICE/OUTPT VISIT, EST, LEVL IV, 30-39 MIN: ICD-10-PCS | Mod: S$GLB,,, | Performed by: INTERNAL MEDICINE

## 2020-09-16 PROCEDURE — 77063 BREAST TOMOSYNTHESIS BI: CPT | Mod: 26,,, | Performed by: RADIOLOGY

## 2020-09-16 PROCEDURE — 77067 SCR MAMMO BI INCL CAD: CPT | Mod: TC,PO

## 2020-09-16 PROCEDURE — 99214 OFFICE O/P EST MOD 30 MIN: CPT | Mod: S$GLB,,, | Performed by: INTERNAL MEDICINE

## 2020-09-16 PROCEDURE — 99999 PR PBB SHADOW E&M-EST. PATIENT-LVL IV: ICD-10-PCS | Mod: PBBFAC,,, | Performed by: INTERNAL MEDICINE

## 2020-09-16 PROCEDURE — 77063 MAMMO DIGITAL SCREENING BILAT WITH TOMO: ICD-10-PCS | Mod: 26,,, | Performed by: RADIOLOGY

## 2020-09-16 PROCEDURE — 99999 PR PBB SHADOW E&M-EST. PATIENT-LVL IV: CPT | Mod: PBBFAC,,, | Performed by: INTERNAL MEDICINE

## 2020-09-16 RX ORDER — METOPROLOL TARTRATE 25 MG/1
TABLET, FILM COATED ORAL
Qty: 90 TABLET | Refills: 1 | Status: SHIPPED | OUTPATIENT
Start: 2020-09-16 | End: 2021-02-22 | Stop reason: SDUPTHER

## 2020-09-16 RX ORDER — LISINOPRIL 10 MG/1
10 TABLET ORAL DAILY
Qty: 90 TABLET | Refills: 1 | Status: SHIPPED | OUTPATIENT
Start: 2020-09-16 | End: 2021-07-26 | Stop reason: SDUPTHER

## 2020-09-16 NOTE — PROGRESS NOTES
Subjective:      09/16/2020    Patient ID: Karolina Gordon is a 55 y.o. female.    Chief Complaint: Anemia and Fatigue    HPI     55F here to f/u anemia & recent ablation. The patient's last visit with me was on 6/1/2020.    After our visit she was seen by Dr. Mcclellan with recommendations for fibroid removal due to significant anemia as well as endometrial biopsy, which was done on 07/01/2020..  She had 2 infusions of IV iron at [facility].  On 08/06/2020 she underwent hysteroscopy D&C and NovaSure.  Advised to follow-up as needed.  Pathology consistent with benign proliferative endometrium with no hyperplasia or abnormal inflammatory infiltrates.    Last labs at [facility] on 08/04/2020 showed H/H 15/46.4, MCV 90.1, and RDW 16.2.    At her last visit the lisinopril 10 mg daily was continued in her metoprolol was reduced from 50 mg twice daily down to 25 mg daily, which was a half a pill.  She had developed a rash with the Wellbutrin was unsure if it was related.  The last fill on file was from July.  Today she states that she still has some medication left so she suspects that she has not been compliant.  She also has on her medication bottle an old prescription for 50 mg twice daily, which she has been taking as half a pill twice daily, which she thinks may be leading to her fatigue as initially she felt well after getting the IV iron infusions and reducing the medication.  She still has some anxiety with her daughter still living at the house with a new baby and having the recent surgical procedures so she would like to hold off on starting any new medication and seen if improvement in her vitals will improve the fatigue.    She is due for mammogram.    Review of patient's allergies indicates:   Allergen Reactions    Sulfa (sulfonamide antibiotics) Other (See Comments)     Unknown, pt was a child when she had reaction       Current Outpatient Medications:     ALPRAZolam (XANAX) 0.25 MG tablet, Take 1 tablet (0.25  mg total) by mouth 2 (two) times daily as needed for Anxiety., Disp: 30 tablet, Rfl: 0    aspirin (ECOTRIN) 81 MG EC tablet, Take 1 tablet (81 mg total) by mouth once daily., Disp: , Rfl:     lisinopriL 10 MG tablet, Take 1 tablet (10 mg total) by mouth once daily., Disp: 90 tablet, Rfl: 1    metoprolol tartrate (LOPRESSOR) 25 MG tablet, Reducing from 50 mg twice daily (was supposed to be taking 1/2 tab daily). Start with 1 tablet daily., Disp: 90 tablet, Rfl: 1    Past Medical History:   Diagnosis Date    Essential hypertension 08/11/2017    Iron deficiency anemia due to chronic blood loss 8/22/2017    Non-rheumatic mitral regurgitation 07/10/2017    Obstructive sleep apnea 04/17/2019    S/P mitral valve repair 9/25/2017     Past Surgical History:   Procedure Laterality Date    MITRAL VALVE REPAIR  09/2017    TUBAL LIGATION  1990's     Family History   Problem Relation Age of Onset    Heart disease Father     Colon cancer Sister     Breast cancer Sister      Social History     Socioeconomic History    Marital status:      Spouse name: Not on file    Number of children: Not on file    Years of education: Not on file    Highest education level: Not on file   Occupational History    Not on file   Social Needs    Financial resource strain: Not on file    Food insecurity     Worry: Not on file     Inability: Not on file    Transportation needs     Medical: Not on file     Non-medical: Not on file   Tobacco Use    Smoking status: Never Smoker    Smokeless tobacco: Never Used   Substance and Sexual Activity    Alcohol use: No    Drug use: Never    Sexual activity: Not Currently     Partners: Male     Birth control/protection: Surgical   Lifestyle    Physical activity     Days per week: Not on file     Minutes per session: Not on file    Stress: Not on file   Relationships    Social connections     Talks on phone: Not on file     Gets together: Not on file     Attends Quaker service:  "Not on file     Active member of club or organization: Not on file     Attends meetings of clubs or organizations: Not on file     Relationship status: Not on file   Other Topics Concern    Not on file   Social History Narrative    Not on file      Review of Systems   Constitutional: Positive for fatigue and unexpected weight change (5 lb weight gain).   HENT: Negative.    Eyes: Negative for visual disturbance.   Respiratory: Negative for cough and shortness of breath.    Cardiovascular: Negative for palpitations.   Gastrointestinal: Negative.    Endocrine: Negative for cold intolerance and heat intolerance.   Genitourinary: Positive for menstrual problem (Improved). Negative for dysuria and vaginal bleeding (No further vaginal bleeding.  Her cycles have lessened in intensity).   Musculoskeletal: Negative for back pain.   Skin: Negative for rash.   Allergic/Immunologic: Negative for environmental allergies.   Neurological: Negative for dizziness and syncope.   Hematological: Does not bruise/bleed easily.   Psychiatric/Behavioral: Positive for decreased concentration. Negative for dysphoric mood and sleep disturbance. The patient is nervous/anxious.          Objective:     Body mass index is 29.38 kg/m².  /63   Pulse 65   Temp 97.9 °F (36.6 °C)   Ht 5' 6" (1.676 m)   Wt 82.6 kg (182 lb)   SpO2 96%   BMI 29.38 kg/m²       Physical Exam  Vitals signs and nursing note reviewed.   Constitutional:       General: She is not in acute distress.     Appearance: She is well-developed. She is not diaphoretic.   HENT:      Head: Normocephalic and atraumatic.   Eyes:      Conjunctiva/sclera: Conjunctivae normal.   Cardiovascular:      Rate and Rhythm: Normal rate and regular rhythm.      Heart sounds: Normal heart sounds. No murmur.      Comments: Well healed sternal scar from MVR  Pulmonary:      Effort: Pulmonary effort is normal. No respiratory distress.      Breath sounds: Normal breath sounds. No stridor. No " wheezing.   Abdominal:      General: Bowel sounds are normal.      Palpations: Abdomen is soft.   Musculoskeletal:         General: No tenderness.   Lymphadenopathy:      Cervical: No cervical adenopathy.   Skin:     General: Skin is warm and dry.      Capillary Refill: Capillary refill takes 2 to 3 seconds.      Findings: No rash.   Neurological:      Mental Status: She is alert and oriented to person, place, and time.      Sensory: No sensory deficit.   Psychiatric:         Behavior: Behavior normal.         Lab Visit on 05/25/2020   Component Date Value Ref Range Status    WBC 05/25/2020 7.81  3.90 - 12.70 K/uL Final    RBC 05/25/2020 4.80  4.00 - 5.40 M/uL Final    Hemoglobin 05/25/2020 12.3  12.0 - 16.0 g/dL Final    Hematocrit 05/25/2020 40.9  37.0 - 48.5 % Final    Mean Corpuscular Volume 05/25/2020 85  82 - 98 fL Final    Mean Corpuscular Hemoglobin 05/25/2020 25.6* 27.0 - 31.0 pg Final    Mean Corpuscular Hemoglobin Conc 05/25/2020 30.1* 32.0 - 36.0 g/dL Final    RDW 05/25/2020 15.0* 11.5 - 14.5 % Final    Platelets 05/25/2020 229  150 - 350 K/uL Final    MPV 05/25/2020 12.4  9.2 - 12.9 fL Final    Immature Granulocytes 05/25/2020 0.3  0.0 - 0.5 % Final    Gran # (ANC) 05/25/2020 4.7  1.8 - 7.7 K/uL Final    Immature Grans (Abs) 05/25/2020 0.02  0.00 - 0.04 K/uL Final    Comment: Mild elevation in immature granulocytes is non specific and   can be seen in a variety of conditions including stress response,   acute inflammation, trauma and pregnancy. Correlation with other   laboratory and clinical findings is essential.      Lymph # 05/25/2020 2.1  1.0 - 4.8 K/uL Final    Mono # 05/25/2020 0.6  0.3 - 1.0 K/uL Final    Eos # 05/25/2020 0.4  0.0 - 0.5 K/uL Final    Baso # 05/25/2020 0.09  0.00 - 0.20 K/uL Final    nRBC 05/25/2020 0  0 /100 WBC Final    Gran% 05/25/2020 59.6  38.0 - 73.0 % Final    Lymph% 05/25/2020 26.8  18.0 - 48.0 % Final    Mono% 05/25/2020 7.6  4.0 - 15.0 % Final     Eosinophil% 05/25/2020 4.5  0.0 - 8.0 % Final    Basophil% 05/25/2020 1.2  0.0 - 1.9 % Final    Differential Method 05/25/2020 Automated   Final    Iron 05/25/2020 40  30 - 160 ug/dL Final    Transferrin 05/25/2020 396* 200 - 375 mg/dL Final    TIBC 05/25/2020 586* 250 - 450 ug/dL Final    Saturated Iron 05/25/2020 7* 20 - 50 % Final    Ferritin 05/25/2020 4* 20.0 - 300.0 ng/mL Final    Retic 05/25/2020 1.0  0.5 - 2.5 % Final     No results found in the last 24 hours.   Assessment:       ICD-10-CM ICD-9-CM   1. Iron deficiency anemia due to chronic blood loss  D50.0 280.0   2. Essential hypertension  I10 401.9   3. Aspirin long-term use  Z79.82 V58.66   4. Uterine leiomyoma, unspecified location  D25.9 218.9   5. Screening for diabetes mellitus (DM)  Z13.1 V77.1   6. Weight gain  R63.5 783.1   7. Encounter for vitamin deficiency screening  Z13.21 V77.99   8. Vitamin D insufficiency  E55.9 268.9   9. Encounter for screening mammogram for malignant neoplasm of breast  Z12.31 V76.12   10. Status post hysteroscopy  Z98.890 V45.89   11. Overweight (BMI 25.0-29.9)  E66.3 278.02   12. S/P mitral valve repair  Z98.890 V45.89   13. Fatigue, unspecified type  R53.83 780.79       Plan:     #NEGRITA  -has lost weight. Study done at [facility]. Get records. Does not have machine at home.     #Attention issues, fatigue  -likely related to SARAH and vitals  -at last visit reduced toprol to 25 mg daily (1/2 50 mg BID) & reduced ASA to 81 mg daily, however she is back to taking 25 mg twice daily.  Her heart rate today is 65 and blood pressure 113/63.  We will adjust as noted below and follow-up in 3 months to reassess.  -did well on Wellbutrin, but unsure if this caused a rash.  She has resumed, but the last fill was in July so suspect that she has not been as compliant    #Anxiety  -xanax reduced to 0.25 mg.  Given #30 at last visit.  Life stressors increased, but overall feels stable    #Iron deficiency anemia  #Uterine  leiomyoma  -10/10/19: h/h/ 11.6/37.7, MCV 75, RDW 16.8  -5/25/20 reviewed ---> unchanged from prior despite iron.   -completed 2 doses of Injectafer at [facility].  Status post ablation and hysteroscopy with Dr. Mcclellan.  Last infusion 6/16 and 6/29.  H/H in the 15 at [facility] last month.  Her cycles have improved.  Repeat labs today.    #MR S/p MVR  -TTE 3/13/18: EF 60-65% with normal DD  -reduced asa 162 mg daily to 81 mg daily at last visit      #HTN  -well controlled.   -119/69, HR 72 at last visit.  We had reduced the 50 mg twice daily of metoprolol down to 25 mg daily, which was half a pill, but she has accidentally resume to 25 mg twice daily.  -Continue Lisinopril 10 mg daily   -sent a new prescription for metoprolol tartrate 25 mg daily.  Advised to continue this regimen for 3 months and we will reassess as she was doing previously with improvement in energy, attention, and stability in blood pressure.  Her blood pressure today is 113/63 and heart rate 65 without medication.  Advised that we will likely reduce the metoprolol further or the lisinopril, but would like her to continue to monitor    #Overweight-BMI 28.60 --> 29.38  Body mass index is 29.38 kg/m².  Wt Readings from Last 1 Encounters:   09/16/20 0750 82.6 kg (182 lb)   -continue healthy diet and exercise   -176 --> 173 --> 177 6/1 --> 182 today, 9/16    #HCM  -Mammogram 7/24/19: birads 1. Mammo ordered.   -Pap. 5/30/19. Dr. Mcclellan. Negative for malignancy & HPV.   -Colonoscopy 3/20/17. Dr. Richey. [facility]. Normal. Repeat in 5 years due to family history.   -TDAP 2/27/20    Return in 3 months. Labs and mammo ordered. meds refilled.     Immunization History   Administered Date(s) Administered    Tdap 02/27/2020       Elizabeth Burns M.D.    Orders Placed This Encounter   Procedures    Mammo Digital Screening Bilat w/ [facility]    Hepatitis C Antibody    Hemoglobin A1C    Vitamin D    CBC auto differential    Comprehensive metabolic panel     Ferritin    Iron and TIBC    Reticulocytes      Medications Ordered This Encounter   Medications    lisinopriL 10 MG tablet     Sig: Take 1 tablet (10 mg total) by mouth once daily.     Dispense:  90 tablet     Refill:  1     .    metoprolol tartrate (LOPRESSOR) 25 MG tablet     Sig: Reducing from 50 mg twice daily (was supposed to be taking 1/2 tab daily). Start with 1 tablet daily.     Dispense:  90 tablet     Refill:  1     .

## 2020-09-17 DIAGNOSIS — E55.9 VITAMIN D INSUFFICIENCY: Primary | ICD-10-CM

## 2020-09-17 RX ORDER — CALCIUM CARB/VITAMIN D3/VIT K1 500-500-40
400 TABLET,CHEWABLE ORAL DAILY
Qty: 90 CAPSULE | Refills: 1 | Status: SHIPPED | OUTPATIENT
Start: 2020-09-17 | End: 2021-01-29

## 2020-09-22 ENCOUNTER — TELEPHONE (OUTPATIENT)
Dept: FAMILY MEDICINE | Facility: CLINIC | Age: 55
End: 2020-09-22

## 2020-09-22 NOTE — TELEPHONE ENCOUNTER
----- Message from Elizabeth Burns MD sent at 9/17/2020 10:23 PM CDT -----  Vitamin d insufficiency. Start vitamin D 400 units daily. I will send in as a prescription, but in the event it is not covered by insurance I have advised the pharmacist to assist in locating a comparable over the counter formulation.    The iron deficiency has resolved!    Please confirm her medications with her (lisinopril 10 mg daily and metoprolol 25 mg daily and aspirin 81 mg daily.     Why was her mammogram not scheduled?    Please advise.     I have advised the patient that the nursing staff would call to review the test results. Please document each attempt to contact the patient and advise me if you are unable to reach the patient before sending a letter. Route back to me the patient's response to the test results so that I may confirm they have been advised and orders have been carried out.     Dr. Burns

## 2020-10-01 ENCOUNTER — TELEPHONE (OUTPATIENT)
Dept: FAMILY MEDICINE | Facility: CLINIC | Age: 55
End: 2020-10-01

## 2020-10-01 ENCOUNTER — OFFICE VISIT (OUTPATIENT)
Dept: FAMILY MEDICINE | Facility: CLINIC | Age: 55
End: 2020-10-01
Payer: COMMERCIAL

## 2020-10-01 VITALS
SYSTOLIC BLOOD PRESSURE: 135 MMHG | DIASTOLIC BLOOD PRESSURE: 78 MMHG | WEIGHT: 181 LBS | TEMPERATURE: 98 F | BODY MASS INDEX: 29.09 KG/M2 | RESPIRATION RATE: 18 BRPM | HEIGHT: 66 IN | HEART RATE: 109 BPM

## 2020-10-01 DIAGNOSIS — R39.9 UTI SYMPTOMS: Primary | ICD-10-CM

## 2020-10-01 LAB
BACTERIA #/AREA URNS HPF: ABNORMAL /HPF
BILIRUB UR QL STRIP: NEGATIVE
CLARITY UR: ABNORMAL
COLOR UR: YELLOW
GLUCOSE UR QL STRIP: NEGATIVE
HGB UR QL STRIP: ABNORMAL
KETONES UR QL STRIP: NEGATIVE
LEUKOCYTE ESTERASE UR QL STRIP: ABNORMAL
MICROSCOPIC COMMENT: ABNORMAL
NITRITE UR QL STRIP: NEGATIVE
NON-SQ EPI CELLS #/AREA URNS HPF: <1 /HPF
PH UR STRIP: 6 [PH] (ref 5–8)
PROT UR QL STRIP: ABNORMAL
RBC #/AREA URNS HPF: 15 /HPF (ref 0–4)
SP GR UR STRIP: 1.02 (ref 1–1.03)
SQUAMOUS #/AREA URNS HPF: 1 /HPF
URN SPEC COLLECT METH UR: ABNORMAL
WBC #/AREA URNS HPF: 23 /HPF (ref 0–5)

## 2020-10-01 PROCEDURE — 99213 PR OFFICE/OUTPT VISIT, EST, LEVL III, 20-29 MIN: ICD-10-PCS | Mod: S$GLB,,, | Performed by: FAMILY MEDICINE

## 2020-10-01 PROCEDURE — 3078F PR MOST RECENT DIASTOLIC BLOOD PRESSURE < 80 MM HG: ICD-10-PCS | Mod: CPTII,S$GLB,, | Performed by: FAMILY MEDICINE

## 2020-10-01 PROCEDURE — 3008F PR BODY MASS INDEX (BMI) DOCUMENTED: ICD-10-PCS | Mod: CPTII,S$GLB,, | Performed by: FAMILY MEDICINE

## 2020-10-01 PROCEDURE — 3008F BODY MASS INDEX DOCD: CPT | Mod: CPTII,S$GLB,, | Performed by: FAMILY MEDICINE

## 2020-10-01 PROCEDURE — 99999 PR PBB SHADOW E&M-EST. PATIENT-LVL III: ICD-10-PCS | Mod: PBBFAC,,, | Performed by: FAMILY MEDICINE

## 2020-10-01 PROCEDURE — 99999 PR PBB SHADOW E&M-EST. PATIENT-LVL III: CPT | Mod: PBBFAC,,, | Performed by: FAMILY MEDICINE

## 2020-10-01 PROCEDURE — 3075F SYST BP GE 130 - 139MM HG: CPT | Mod: CPTII,S$GLB,, | Performed by: FAMILY MEDICINE

## 2020-10-01 PROCEDURE — 3075F PR MOST RECENT SYSTOLIC BLOOD PRESS GE 130-139MM HG: ICD-10-PCS | Mod: CPTII,S$GLB,, | Performed by: FAMILY MEDICINE

## 2020-10-01 PROCEDURE — 3078F DIAST BP <80 MM HG: CPT | Mod: CPTII,S$GLB,, | Performed by: FAMILY MEDICINE

## 2020-10-01 PROCEDURE — 81000 URINALYSIS NONAUTO W/SCOPE: CPT | Mod: PO

## 2020-10-01 PROCEDURE — 99213 OFFICE O/P EST LOW 20 MIN: CPT | Mod: S$GLB,,, | Performed by: FAMILY MEDICINE

## 2020-10-01 RX ORDER — CEPHALEXIN 500 MG/1
500 CAPSULE ORAL EVERY 12 HOURS
Qty: 14 CAPSULE | Refills: 0 | Status: SHIPPED | OUTPATIENT
Start: 2020-10-01 | End: 2021-01-29

## 2020-10-01 RX ORDER — NEOMYCIN SULFATE, POLYMYXIN B SULFATE AND HYDROCORTISONE 10; 3.5; 1 MG/ML; MG/ML; [USP'U]/ML
3 SUSPENSION/ DROPS AURICULAR (OTIC) 2 TIMES DAILY
Qty: 10 ML | Refills: 0 | Status: SHIPPED | OUTPATIENT
Start: 2020-10-01 | End: 2021-01-29

## 2020-10-01 NOTE — PROGRESS NOTES
The patient presents with a history of dysuria and frequency,denies flank pain,vomiting or significant fever.   Past Medical History:  Past Medical History:   Diagnosis Date    Essential hypertension 08/11/2017    Iron deficiency anemia due to chronic blood loss 8/22/2017    Non-rheumatic mitral regurgitation 07/10/2017    Obstructive sleep apnea 04/17/2019    S/P mitral valve repair 9/25/2017     Past Surgical History:   Procedure Laterality Date    MITRAL VALVE REPAIR  09/2017    TUBAL LIGATION  1990's     Review of patient's allergies indicates:   Allergen Reactions    Sulfa (sulfonamide antibiotics) Other (See Comments)     Unknown, pt was a child when she had reaction     Current Outpatient Medications on File Prior to Visit   Medication Sig Dispense Refill    ALPRAZolam (XANAX) 0.25 MG tablet Take 1 tablet (0.25 mg total) by mouth 2 (two) times daily as needed for Anxiety. 30 tablet 0    aspirin (ECOTRIN) 81 MG EC tablet Take 1 tablet (81 mg total) by mouth once daily.      cholecalciferol, vitamin D3, 10 mcg (400 unit) Cap Take 1 capsule (400 Units total) by mouth once daily. 90 capsule 1    lisinopriL 10 MG tablet Take 1 tablet (10 mg total) by mouth once daily. 90 tablet 1    metoprolol tartrate (LOPRESSOR) 25 MG tablet Reducing from 50 mg twice daily (was supposed to be taking 1/2 tab daily). Start with 1 tablet daily. 90 tablet 1     No current facility-administered medications on file prior to visit.      Social History     Socioeconomic History    Marital status:      Spouse name: Not on file    Number of children: Not on file    Years of education: Not on file    Highest education level: Not on file   Occupational History    Not on file   Social Needs    Financial resource strain: Not on file    Food insecurity     Worry: Not on file     Inability: Not on file    Transportation needs     Medical: Not on file     Non-medical: Not on file   Tobacco Use    Smoking status:  Never Smoker    Smokeless tobacco: Never Used   Substance and Sexual Activity    Alcohol use: No    Drug use: Never    Sexual activity: Not Currently     Partners: Male     Birth control/protection: Surgical   Lifestyle    Physical activity     Days per week: Not on file     Minutes per session: Not on file    Stress: Not on file   Relationships    Social connections     Talks on phone: Not on file     Gets together: Not on file     Attends Religion service: Not on file     Active member of club or organization: Not on file     Attends meetings of clubs or organizations: Not on file     Relationship status: Not on file   Other Topics Concern    Not on file   Social History Narrative    Not on file     Family History   Problem Relation Age of Onset    Heart disease Father     Colon cancer Sister     Breast cancer Sister        ROS: Denies weight loss  Respiratory/CV: No cough dyspnea or chest pain  GI:No nausea vomiting diarrhea  :Frequency dysuria  SKIN:No rashes or change in texture    PE Vital signs noted  Heent: Normocephalic,with no recent trauma,PERRLA,EOMI,conjunctiva clear with no exudate.Nasopharynx is not injected .Otic canal.TMs are not affected.Pharynx is normal.  Neck:Supple without adenopathy  Chest:Clear bilateral breath sounds normal  Heart:Regular rhthym without murmer  Abdomen:Soft, mild superpubic tenderness,no rebound,no masses, no hepatosplenomegaly,no flank tenderness  Extremeties and Neurologic:Grossly within normal limits     U/A is abnormal     Impression: .0     Plan:        Cephalexin, cortotic

## 2020-10-01 NOTE — TELEPHONE ENCOUNTER
----- Message from Dionne Barahona sent at 10/1/2020  8:26 AM CDT -----  Regarding: appt  Contact: pt  Type:  Same Day Appointment Request    Caller is requesting a same day appointment.  Caller declined first available appointment listed below.    Name of Caller:pt   When is the first available appointment? N/a  Symptoms:chills, nauseated, body aches  Best Call Back Number: [phone]  Additional Information: The pt doesn't have a MyChart acct and wants to schedule a audio appt.

## 2020-12-07 NOTE — PROGRESS NOTES
Procedure:  82657 - Right Total Hip Arthroplasty  Patient's preferred date:  To be determined  Duration of Procedure: 1 1/2 hrs  Hospital:  [facility] as 2nd choice.  Anesthesia: General  Length of Stay: Inpatient  Equipment: - [employer], Wardell table required  Surgical Assist: Yes, PA  Special Instructions: Wardell table, Direct Anterior protocol  BMI Estimated body mass index is 21.68 kg/m² as calculated from the following:    Height as of 9/16/20: 5' 7\" (1.702 m).    Weight as of 9/16/20: 62.8 kg.  Hip - Approach: Direct Anterior     Please coordinate:   - First postop visit at 10-14 days postop with MD.  - Preop appt with PA:  No  - Preop H&P / Testing to be done by: PCP  - [facility]:  Yes  - Prehab: Yes  - Postop PT: Yes   Received most recent pap smear with hpv scanned into chart today.

## 2021-01-29 ENCOUNTER — OFFICE VISIT (OUTPATIENT)
Dept: FAMILY MEDICINE | Facility: CLINIC | Age: 56
End: 2021-01-29
Payer: COMMERCIAL

## 2021-01-29 VITALS
RESPIRATION RATE: 18 BRPM | HEART RATE: 92 BPM | OXYGEN SATURATION: 98 % | BODY MASS INDEX: 28.8 KG/M2 | WEIGHT: 179.19 LBS | TEMPERATURE: 98 F | HEIGHT: 66 IN | SYSTOLIC BLOOD PRESSURE: 100 MMHG | DIASTOLIC BLOOD PRESSURE: 76 MMHG

## 2021-01-29 DIAGNOSIS — I10 ESSENTIAL HYPERTENSION: Chronic | ICD-10-CM

## 2021-01-29 DIAGNOSIS — F43.21 GRIEF: Primary | ICD-10-CM

## 2021-01-29 DIAGNOSIS — Z71.89 GRIEF COUNSELING: ICD-10-CM

## 2021-01-29 DIAGNOSIS — Z98.890 S/P MITRAL VALVE REPAIR: Chronic | ICD-10-CM

## 2021-01-29 DIAGNOSIS — E55.9 VITAMIN D INSUFFICIENCY: ICD-10-CM

## 2021-01-29 DIAGNOSIS — F41.9 ANXIETY: ICD-10-CM

## 2021-01-29 DIAGNOSIS — Z79.82 ASPIRIN LONG-TERM USE: Chronic | ICD-10-CM

## 2021-01-29 DIAGNOSIS — E66.3 OVERWEIGHT (BMI 25.0-29.9): ICD-10-CM

## 2021-01-29 PROCEDURE — 99999 PR PBB SHADOW E&M-EST. PATIENT-LVL IV: ICD-10-PCS | Mod: PBBFAC,,, | Performed by: INTERNAL MEDICINE

## 2021-01-29 PROCEDURE — 1126F PR PAIN SEVERITY QUANTIFIED, NO PAIN PRESENT: ICD-10-PCS | Mod: S$GLB,,, | Performed by: INTERNAL MEDICINE

## 2021-01-29 PROCEDURE — 3008F PR BODY MASS INDEX (BMI) DOCUMENTED: ICD-10-PCS | Mod: CPTII,S$GLB,, | Performed by: INTERNAL MEDICINE

## 2021-01-29 PROCEDURE — 3008F BODY MASS INDEX DOCD: CPT | Mod: CPTII,S$GLB,, | Performed by: INTERNAL MEDICINE

## 2021-01-29 PROCEDURE — 1126F AMNT PAIN NOTED NONE PRSNT: CPT | Mod: S$GLB,,, | Performed by: INTERNAL MEDICINE

## 2021-01-29 PROCEDURE — 99999 PR PBB SHADOW E&M-EST. PATIENT-LVL IV: CPT | Mod: PBBFAC,,, | Performed by: INTERNAL MEDICINE

## 2021-01-29 PROCEDURE — 99215 PR OFFICE/OUTPT VISIT, EST, LEVL V, 40-54 MIN: ICD-10-PCS | Mod: S$GLB,,, | Performed by: INTERNAL MEDICINE

## 2021-01-29 PROCEDURE — 99215 OFFICE O/P EST HI 40 MIN: CPT | Mod: S$GLB,,, | Performed by: INTERNAL MEDICINE

## 2021-01-29 RX ORDER — LORAZEPAM 0.5 MG/1
0.5 TABLET ORAL 2 TIMES DAILY
Qty: 60 TABLET | Refills: 0 | Status: SHIPPED | OUTPATIENT
Start: 2021-01-29

## 2021-01-29 RX ORDER — VIT C/E/ZN/COPPR/LUTEIN/ZEAXAN 250MG-90MG
1000 CAPSULE ORAL DAILY
Qty: 90 CAPSULE | Refills: 0 | Status: SHIPPED | OUTPATIENT
Start: 2021-01-29

## 2021-01-29 RX ORDER — FLUOXETINE 10 MG/1
10 TABLET ORAL NIGHTLY
Qty: 30 TABLET | Refills: 0 | Status: SHIPPED | OUTPATIENT
Start: 2021-01-29 | End: 2021-02-22 | Stop reason: SDUPTHER

## 2021-01-31 PROBLEM — F41.9 ANXIETY: Status: ACTIVE | Noted: 2021-01-31

## 2021-01-31 PROBLEM — Z98.890 STATUS POST HYSTEROSCOPY: Chronic | Status: ACTIVE | Noted: 2020-09-16

## 2021-02-22 ENCOUNTER — OFFICE VISIT (OUTPATIENT)
Dept: FAMILY MEDICINE | Facility: CLINIC | Age: 56
End: 2021-02-22
Payer: COMMERCIAL

## 2021-02-22 VITALS
OXYGEN SATURATION: 98 % | RESPIRATION RATE: 16 BRPM | BODY MASS INDEX: 29.25 KG/M2 | DIASTOLIC BLOOD PRESSURE: 75 MMHG | TEMPERATURE: 99 F | SYSTOLIC BLOOD PRESSURE: 124 MMHG | WEIGHT: 182 LBS | HEIGHT: 66 IN | HEART RATE: 80 BPM

## 2021-02-22 DIAGNOSIS — Z86.39 HISTORY OF IRON DEFICIENCY: ICD-10-CM

## 2021-02-22 DIAGNOSIS — R73.01 IMPAIRED FASTING GLUCOSE: ICD-10-CM

## 2021-02-22 DIAGNOSIS — F41.9 ANXIETY: ICD-10-CM

## 2021-02-22 DIAGNOSIS — Z13.220 SCREENING FOR LIPID DISORDERS: ICD-10-CM

## 2021-02-22 DIAGNOSIS — Z13.0 SCREENING FOR DEFICIENCY ANEMIA: ICD-10-CM

## 2021-02-22 DIAGNOSIS — Z98.890 STATUS POST HYSTEROSCOPY: ICD-10-CM

## 2021-02-22 DIAGNOSIS — Z71.89 GRIEF COUNSELING: ICD-10-CM

## 2021-02-22 DIAGNOSIS — Z00.00 WELLNESS EXAMINATION: Primary | ICD-10-CM

## 2021-02-22 DIAGNOSIS — Z13.1 SCREENING FOR DIABETES MELLITUS (DM): ICD-10-CM

## 2021-02-22 DIAGNOSIS — F43.21 GRIEF: ICD-10-CM

## 2021-02-22 DIAGNOSIS — Z98.890 S/P MITRAL VALVE REPAIR: ICD-10-CM

## 2021-02-22 DIAGNOSIS — E55.9 VITAMIN D INSUFFICIENCY: ICD-10-CM

## 2021-02-22 DIAGNOSIS — R79.89 LOW VITAMIN B12 LEVEL: ICD-10-CM

## 2021-02-22 DIAGNOSIS — Z13.29 SCREENING FOR THYROID DISORDER: ICD-10-CM

## 2021-02-22 DIAGNOSIS — I10 ESSENTIAL HYPERTENSION: Chronic | ICD-10-CM

## 2021-02-22 DIAGNOSIS — Z13.89 SCREENING FOR HEMATURIA OR PROTEINURIA: ICD-10-CM

## 2021-02-22 DIAGNOSIS — Z13.21 ENCOUNTER FOR VITAMIN DEFICIENCY SCREENING: ICD-10-CM

## 2021-02-22 DIAGNOSIS — Z79.82 ASPIRIN LONG-TERM USE: ICD-10-CM

## 2021-02-22 DIAGNOSIS — E66.3 OVERWEIGHT (BMI 25.0-29.9): ICD-10-CM

## 2021-02-22 DIAGNOSIS — Z79.899 ENCOUNTER FOR LONG-TERM CURRENT USE OF MEDICATION: ICD-10-CM

## 2021-02-22 PROCEDURE — 1126F PR PAIN SEVERITY QUANTIFIED, NO PAIN PRESENT: ICD-10-PCS | Mod: S$GLB,,, | Performed by: INTERNAL MEDICINE

## 2021-02-22 PROCEDURE — 1126F AMNT PAIN NOTED NONE PRSNT: CPT | Mod: S$GLB,,, | Performed by: INTERNAL MEDICINE

## 2021-02-22 PROCEDURE — 3008F BODY MASS INDEX DOCD: CPT | Mod: CPTII,S$GLB,, | Performed by: INTERNAL MEDICINE

## 2021-02-22 PROCEDURE — 99999 PR PBB SHADOW E&M-EST. PATIENT-LVL IV: ICD-10-PCS | Mod: PBBFAC,,, | Performed by: INTERNAL MEDICINE

## 2021-02-22 PROCEDURE — 99396 PREV VISIT EST AGE 40-64: CPT | Mod: S$GLB,,, | Performed by: INTERNAL MEDICINE

## 2021-02-22 PROCEDURE — 99396 PR PREVENTIVE VISIT,EST,40-64: ICD-10-PCS | Mod: S$GLB,,, | Performed by: INTERNAL MEDICINE

## 2021-02-22 PROCEDURE — 99999 PR PBB SHADOW E&M-EST. PATIENT-LVL IV: CPT | Mod: PBBFAC,,, | Performed by: INTERNAL MEDICINE

## 2021-02-22 PROCEDURE — 3008F PR BODY MASS INDEX (BMI) DOCUMENTED: ICD-10-PCS | Mod: CPTII,S$GLB,, | Performed by: INTERNAL MEDICINE

## 2021-02-22 RX ORDER — FLUOXETINE 10 MG/1
10 TABLET ORAL NIGHTLY
Qty: 90 TABLET | Refills: 0 | Status: SHIPPED | OUTPATIENT
Start: 2021-02-22

## 2021-02-22 RX ORDER — PHENTERMINE HYDROCHLORIDE 37.5 MG/1
37.5 TABLET ORAL
COMMUNITY
Start: 2020-12-06 | End: 2021-02-22

## 2021-02-22 RX ORDER — METOPROLOL TARTRATE 25 MG/1
25 TABLET, FILM COATED ORAL DAILY
Qty: 90 TABLET | Refills: 1 | Status: SHIPPED | OUTPATIENT
Start: 2021-02-22

## 2021-03-10 DIAGNOSIS — F41.9 ANXIETY: ICD-10-CM

## 2021-03-10 DIAGNOSIS — F43.21 GRIEF: ICD-10-CM

## 2021-03-10 DIAGNOSIS — Z71.89 GRIEF COUNSELING: ICD-10-CM

## 2021-03-10 RX ORDER — FLUOXETINE 10 MG/1
10 TABLET ORAL NIGHTLY
Qty: 90 TABLET | Refills: 0 | Status: CANCELLED | OUTPATIENT
Start: 2021-03-10

## 2021-07-18 NOTE — ASSESSMENT & PLAN NOTE
BG goal 110-140 for the first 3 days postop. Continue CTS protocol, change bG to q2h. Can change to transition insulin infusion once insulin needs decrease.      Discharge Planning: Anticipate resolution    
BG goal 110-140 for the first 3 days postop. Continue CTS protocol, hourly BG monitoring.     Discharge Planning: Anticipate resolution    
BG goal 110-140. BG AC/HS with low dose correction. Sign off in AM if no insulin needs.      Discharge Planning: Anticipate continued resolution    
Per CTS    
Resolving, will sign off  Please re-consult for changes     
S/P mitral valve repair  Per CTS    
S/p MVR    
S/p MVR    
show

## 2021-07-26 DIAGNOSIS — I10 ESSENTIAL HYPERTENSION: Chronic | ICD-10-CM

## 2021-07-27 ENCOUNTER — PATIENT MESSAGE (OUTPATIENT)
Dept: FAMILY MEDICINE | Facility: CLINIC | Age: 56
End: 2021-07-27

## 2021-07-27 RX ORDER — LISINOPRIL 10 MG/1
10 TABLET ORAL DAILY
Qty: 90 TABLET | Refills: 1 | Status: SHIPPED | OUTPATIENT
Start: 2021-07-27

## 2022-03-08 ENCOUNTER — PATIENT MESSAGE (OUTPATIENT)
Dept: FAMILY MEDICINE | Facility: CLINIC | Age: 57
End: 2022-03-08
Payer: COMMERCIAL

## 2022-03-15 ENCOUNTER — PATIENT OUTREACH (OUTPATIENT)
Dept: ADMINISTRATIVE | Facility: HOSPITAL | Age: 57
End: 2022-03-15
Payer: COMMERCIAL

## 2022-05-12 NOTE — PROGRESS NOTES
Progress Note  Surgical Intensive Care      Admit Date: 2017  Post-operative Day: 1 Day Post-Op  Hospital Day: 2    Karolina Jacobs is a 52 y.o. female who present with complaints of dyspnea on exertion. PMHx of mitral valve insufficiency in , anemia, HTN. She was told for several years prior to that she had mitral valve prolapse, but no echocardiography was done.  She has developed significant progressive exertional dyspnea with fewer and lighter activities. She has undergone left and right heart catheterization as well as transesophageal echocardiography.     INTERVAL HISTORY:   NAEON.  Pt extubated with no issues. Patient OOB today. Currently only requiring cardene gtt      Continuous Infusions:   sodium chloride 0.9% Stopped (17 0826)    dextrose 5 % and 0.45 % NaCl with KCl 20 mEq 5 mL/hr at 17 0900    epinephrine Stopped (17 1330)    insulin (HUMAN R) infusion (adults) 4.7 Units/hr (17 0900)    propofol       Scheduled Meds:   albuterol-ipratropium 2.5mg-0.5mg/3mL  3 mL Nebulization Q4H    aspirin  325 mg Oral Daily    atorvastatin  40 mg Oral QHS    ceFAZolin (ANCEF) IVPB  2 g Intravenous Q8H    docusate sodium  100 mg Oral BID    furosemide  20 mg Intravenous BID    metoprolol tartrate  12.5 mg Oral Q12H    mupirocin  1 g Nasal BID    pantoprazole  40 mg Intravenous Daily    polyethylene glycol  17 g Oral Daily    potassium chloride  20 mEq Oral Q12H    sodium chloride 0.9%  3 mL Intravenous Q8H     PRN Meds:acetaminophen, albuterol-ipratropium 2.5mg-0.5mg/3mL, bisacodyl, dextrose 50%, dextrose 50%, fentaNYL, [] fentaNYL **FOLLOWED BY** fentaNYL, hydrALAZINE, magnesium sulfate IVPB, magnesium sulfate IVPB, metoclopramide HCl, ondansetron, oxycodone, oxycodone, potassium chloride **AND** potassium chloride **AND** potassium chloride, sodium phosphate IVPB, sodium phosphate IVPB, sodium phosphate IVPB    Review of patient's allergies indicates:    Allergen Reactions    Sulfa (sulfonamide antibiotics) Other (See Comments)     Unknown, pt was a child when she had reaction       OBJECTIVE:     Vital Signs (Most Recent)  Temp: 98.9 °F (37.2 °C) (09/26/17 0715)  Pulse: 98 (09/26/17 0915)  Resp: 16 (09/26/17 0915)  BP: (!) 108/56 (09/26/17 0715)  SpO2: 96 % (09/26/17 0915)    Vital Signs Range (Last 24H):  Temp:  [97.6 °F (36.4 °C)-98.9 °F (37.2 °C)]   Pulse:  []   Resp:  [3-21]   BP: (100-110)/(52-67)   SpO2:  [95 %-100 %]   Arterial Line BP: ()/(50-75)     I & O (Last 24H):  Intake/Output Summary (Last 24 hours) at 09/26/17 0941  Last data filed at 09/26/17 0800   Gross per 24 hour   Intake             3568 ml   Output             4951 ml   Net            -1383 ml     Ventilator Data (Last 24H):     Vent Mode: Spont  Oxygen Concentration (%):  [] 40  Resp Rate Total:  [13 br/min-21 br/min] 17 br/min  Vt Set:  [450 mL] 450 mL  PEEP/CPAP:  [5 cmH20] 5 cmH20  Pressure Support:  [0 ajL05-40 cmH20] 12 cmH20  Mean Airway Pressure:  [8 cmH20-10 cmH20] 8 cmH20    Hemodynamic Parameters (Last 24H):       Physical Exam:  General: NAD  Neuro: AAOx4  HEENT:   Cardio: S1 and S2, RRR  Resp: Moving air appropriately, breathing even and unlabored  Abd: Soft, NT, ND  Ext: Warm and well perfused        Laboratory (Last 24H):  CBC:   Recent Labs  Lab 09/26/17  0314   WBC 16.68*   RBC 4.34   HGB 8.8*   HCT 29.0*      MCV 67*   MCH 20.3*   MCHC 30.3*       CMP:   Recent Labs  Lab 09/21/17  1217  09/26/17  0314 09/26/17  0800   GLU 73  < > 142*  --    CALCIUM 9.3  < > 8.3*  --    ALBUMIN 3.6  --   --   --    PROT 7.4  --   --   --      < > 137  --    K 4.1  < > 4.1 4.2   CO2 25  < > 24  --      < > 104  --    BUN 11  < > 7  --    CREATININE 0.9  < > 0.7  --    ALKPHOS 87  --   --   --    ALT 12  --   --   --    AST 17  --   --   --    BILITOT 0.4  --   --   --    < > = values in this interval not displayed.    ABGs:   Recent Labs  Lab  09/25/17  1321   PH 7.417   PCO2 40.0   PO2 369*   HCO3 25.8   POCSATURATED 100   BE 1         Chest X-Ray:      ASSESSMENT/PLAN:     Neuro:  - Pain control - oxycodone Po, fentanyl IV    Resp:  -  Extubated on Ra.  -Continue ISS    CV:  - HDS  -Currently on cardene will attempt to wean today and switch to PO BP meds.   -CT to suction    Heme/ID:  - H/H stable  -continue post ABX    Renal:  - Rivas in place  - Strict I/Os  - lasix 20 BID    FEN/GI:  - advance as tolerated  - Replace lytes PRN    Endo:  - SSI    Dispo:  - Stepdown planned for today      Tracy Sweeney MD  SICU Resident PGY III  9/26/2017 9:41 AM   Burow's Advancement Flap Text: The defect edges were debeveled with a #15 scalpel blade.  Given the location of the defect and the proximity to free margins a Burow's advancement flap was deemed most appropriate.  Using a sterile surgical marker, the appropriate advancement flap was drawn incorporating the defect and placing the expected incisions within the relaxed skin tension lines where possible.    The area thus outlined was incised deep to adipose tissue with a #15 scalpel blade.  The skin margins were undermined to an appropriate distance in all directions utilizing iris scissors.

## 2022-08-22 NOTE — PROGRESS NOTES
Opened new encounter by accident.      Pt  Is laying flat for 1 hour after procedure then will be discharged after. Bandage is to remain on  For 24 hrs and then can remove and shower.

## (undated) DEVICE — DRESSING ADH ISLAND 3.6 X 14

## (undated) DEVICE — TAPE SURG MEDIPORE 6X72IN

## (undated) DEVICE — KIT SAHARA DRAPE DRAW/LIFT

## (undated) DEVICE — DRAPE SPLIT STERILE

## (undated) DEVICE — BLADE SAW STERNAL 5/BX

## (undated) DEVICE — SUT 2-0 VICRYL / CT-1

## (undated) DEVICE — LOOP VESSEL BLUE MAXI

## (undated) DEVICE — Device

## (undated) DEVICE — SUT 6 18IN STEEL MONO CCS

## (undated) DEVICE — SUT PROLENE 4-0 RB-1 BL MO

## (undated) DEVICE — TRAY FOLEY 16FR INFECTION CONT

## (undated) DEVICE — INSERTS STEALTH FIBRA SIZE 5

## (undated) DEVICE — SUT ETHIBOND XTRA 2-0 SH-2

## (undated) DEVICE — SUT VICRYL BR 1 GEN 27 CT-1

## (undated) DEVICE — DRAIN CHEST DRY SUCTION

## (undated) DEVICE — SUT PROLENE 4-0 SH BLU 36IN

## (undated) DEVICE — CLOSURE SKIN STERI STRIP 1/2X4

## (undated) DEVICE — BRA SURGICAL LG 38-40

## (undated) DEVICE — FOGERTY SOFT JAW DISP 2/PK

## (undated) DEVICE — DRAPE SLUSH WARMER WITH DISC

## (undated) DEVICE — CONTAINER SPECIMEN STRL 4OZ

## (undated) DEVICE — TRAY HEART

## (undated) DEVICE — SUT SILK BLK BR. 2 2-60

## (undated) DEVICE — ELECTRODE REM PLYHSV RETURN 9

## (undated) DEVICE — DRESSING SPONGE 8PLY 4X4 STRL

## (undated) DEVICE — GAUZE SPONGE 4X4 12PLY

## (undated) DEVICE — PROBE CATH TEMP 16 FRFOLEY 400

## (undated) DEVICE — SUT SILK 2-0 SH 18IN BLACK

## (undated) DEVICE — SUT 2/0 30IN ETHIBOND

## (undated) DEVICE — DRAIN CHANNEL ROUND 19FR

## (undated) DEVICE — WIRE INTRAMYOCARDIAL TEMP